# Patient Record
Sex: MALE | Race: WHITE | NOT HISPANIC OR LATINO | Employment: OTHER | URBAN - NONMETROPOLITAN AREA
[De-identification: names, ages, dates, MRNs, and addresses within clinical notes are randomized per-mention and may not be internally consistent; named-entity substitution may affect disease eponyms.]

---

## 2018-12-07 ENCOUNTER — OFFICE VISIT (OUTPATIENT)
Dept: ENDOCRINOLOGY | Facility: CLINIC | Age: 78
End: 2018-12-07

## 2018-12-07 VITALS
SYSTOLIC BLOOD PRESSURE: 132 MMHG | DIASTOLIC BLOOD PRESSURE: 78 MMHG | HEART RATE: 95 BPM | WEIGHT: 210.5 LBS | HEIGHT: 71 IN | OXYGEN SATURATION: 96 % | BODY MASS INDEX: 29.47 KG/M2

## 2018-12-07 DIAGNOSIS — Z98.890 HISTORY OF PITUITARY SURGERY: Primary | ICD-10-CM

## 2018-12-07 DIAGNOSIS — E23.0 PANHYPOPITUITARISM (HCC): ICD-10-CM

## 2018-12-07 DIAGNOSIS — I10 ESSENTIAL HYPERTENSION: ICD-10-CM

## 2018-12-07 PROCEDURE — 99204 OFFICE O/P NEW MOD 45 MIN: CPT | Performed by: INTERNAL MEDICINE

## 2018-12-07 RX ORDER — HYDROCHLOROTHIAZIDE 25 MG/1
50 TABLET ORAL DAILY
COMMUNITY

## 2018-12-07 RX ORDER — HYDROCORTISONE 5 MG/1
5 TABLET ORAL DAILY
Qty: 120 TABLET | Refills: 5 | Status: SHIPPED | OUTPATIENT
Start: 2018-12-07 | End: 2019-12-07

## 2018-12-07 RX ORDER — CORTISONE ACETATE 25 MG/1
20 TABLET ORAL DAILY
COMMUNITY
End: 2018-12-07

## 2018-12-07 RX ORDER — FERROUS GLUCONATE 324(37.5)
324 TABLET ORAL
COMMUNITY

## 2018-12-07 RX ORDER — ROSUVASTATIN CALCIUM 5 MG/1
5 TABLET, COATED ORAL DAILY
COMMUNITY

## 2018-12-07 RX ORDER — LOSARTAN POTASSIUM 100 MG/1
100 TABLET ORAL DAILY
COMMUNITY

## 2018-12-07 RX ORDER — HYDROCORTISONE 10 MG/1
10 TABLET ORAL DAILY
Qty: 60 TABLET | Refills: 11 | Status: SHIPPED | OUTPATIENT
Start: 2018-12-07 | End: 2019-12-07

## 2018-12-07 RX ORDER — LEVOTHYROXINE SODIUM 0.03 MG/1
50 TABLET ORAL DAILY
COMMUNITY
End: 2019-01-22

## 2018-12-07 NOTE — PATIENT INSTRUCTIONS
Week 1     15 mg am and 10 mg pm     Week 2    10 mg and 10 mg pm     Week 3    10 mg and 5 mg    Week 4    10 and nothing     On Jan 7th  come to the lab in early morning to do blood work and saliva testing     Appt with us on Jan 10th

## 2018-12-07 NOTE — PROGRESS NOTES
Simeon Robin is a 78 y.o. 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     Family History   Problem Relation Age of Onset   • Hypertension Mother      Social History     Tobacco Use   • Smoking status: Never Smoker   • Smokeless tobacco: Never Used   Substance Use Topics   • Alcohol use: No     Frequency: Never   • Drug use: No         Current Outpatient Medications:   •  ferrous gluconate 324 (37.5 Fe) MG tablet tablet, Take 324 mg by mouth Daily With Breakfast., Disp: , Rfl:   •  hydrochlorothiazide (HYDRODIURIL) 25 MG tablet, Take 50 mg by mouth Daily., Disp: , Rfl:   •  levothyroxine (SYNTHROID, LEVOTHROID) 25 MCG tablet, Take 50 mcg by mouth Daily., Disp: , Rfl:   •  losartan (COZAAR) 100 MG tablet, Take 100 mg by mouth Daily., Disp: , Rfl:   •  rosuvastatin (CRESTOR) 5 MG tablet, Take 5 mg by mouth Daily., Disp: , Rfl:   •  hydrocortisone (CORTEF) 10 MG tablet, Take 1 tablet by mouth Daily. Up to 1 tab po bid, Disp: 60 tablet, Rfl: 11  •  hydrocortisone (CORTEF) 5 MG tablet, Take 1 tablet by mouth Daily. Up to 2 tabs po bid, Disp: 120 tablet, Rfl: 5    Review of Systems    Review of Systems   Constitutional: Positive for unexpected weight change. Negative for activity change, appetite change, chills, diaphoresis, fatigue and fever.   HENT: Negative for congestion, dental problem, drooling, ear discharge, ear pain, facial swelling, mouth sores, postnasal drip, rhinorrhea, sinus pressure, sore throat, tinnitus,  "trouble swallowing and voice change.    Eyes: Negative for photophobia, pain, discharge, redness, itching and visual disturbance.   Respiratory: Negative for apnea, cough, choking, chest tightness, shortness of breath, wheezing and stridor.    Cardiovascular: Negative for chest pain, palpitations and leg swelling.   Gastrointestinal: Negative for abdominal distention, abdominal pain, constipation, diarrhea, nausea and vomiting.   Endocrine: Negative for cold intolerance, heat intolerance, polydipsia, polyphagia and polyuria.   Genitourinary: Negative for decreased urine volume, difficulty urinating, dysuria, flank pain, frequency, hematuria and urgency.   Musculoskeletal: Negative for arthralgias, back pain, gait problem, joint swelling, myalgias, neck pain and neck stiffness.   Skin: Negative for color change, pallor, rash and wound.   Allergic/Immunologic: Negative for immunocompromised state.   Neurological: Negative for dizziness, tremors, seizures, syncope, facial asymmetry, speech difficulty, weakness, light-headedness, numbness and headaches.   Hematological: Negative for adenopathy.   Psychiatric/Behavioral: Negative for agitation, behavioral problems, confusion, decreased concentration, dysphoric mood, hallucinations, self-injury, sleep disturbance and suicidal ideas. The patient is not nervous/anxious and is not hyperactive.         Objective:   /78   Pulse 95   Ht 180.3 cm (71\")   Wt 95.5 kg (210 lb 8 oz)   SpO2 96%   BMI 29.36 kg/m²     Physical Exam   Constitutional: He is oriented to person, place, and time. He appears well-developed and well-nourished. He is cooperative.   HENT:   Head: Normocephalic and atraumatic.   Right Ear: External ear normal.   Left Ear: External ear normal.   Nose: Nose normal.   Mouth/Throat: Oropharynx is clear and moist. No oropharyngeal exudate.   Eyes: Conjunctivae and EOM are normal. Pupils are equal, round, and reactive to light. No scleral icterus. Right eye " exhibits normal extraocular motion. Left eye exhibits normal extraocular motion.   Neck: Neck supple. No JVD present. No muscular tenderness present. No tracheal deviation, no edema and no erythema present. No thyromegaly present.   Cardiovascular: Normal rate, regular rhythm, normal heart sounds and intact distal pulses. Exam reveals no gallop and no friction rub.   No murmur heard.  Pulmonary/Chest: Effort normal and breath sounds normal. No stridor. No respiratory distress. He has no decreased breath sounds. He has no wheezes. He has no rhonchi. He has no rales. He exhibits no tenderness.   Abdominal: Soft. Bowel sounds are normal. He exhibits no distension and no mass. There is no hepatomegaly. There is no tenderness. There is no rebound and no guarding. No hernia.   Musculoskeletal: Normal range of motion. He exhibits no edema, tenderness or deformity.   Lymphadenopathy:     He has no cervical adenopathy.   Neurological: He is alert and oriented to person, place, and time. He has normal reflexes. No cranial nerve deficit. He exhibits normal muscle tone. Coordination normal.   Skin: Skin is warm. No rash noted. No erythema. No pallor.   Psychiatric: He has a normal mood and affect. His behavior is normal. Judgment and thought content normal.   Nursing note and vitals reviewed.      Lab Review    No results found for this or any previous visit.      Assessment/Plan       ICD-10-CM ICD-9-CM   1. History of pituitary surgery Z98.890 V45.89   2. Panhypopituitarism (CMS/HCC) E23.0 253.2   3. Essential hypertension I10 401.9         I reviewed and summarized records from Yina Daniels APRN from 2018 and I reviewed / ordered labs.   From review of records :    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    He is presently on levothyroxine 50 mcgs daily and hydrocortisone 30 mg daily     Plan is to taper steroids :    Week 1     15 mg am and 10 mg pm     Week 2    10 mg and 10 mg pm     Week 3    10 mg and 5 mg    Week  4    10 and nothing     On Jan 7th  come to the lab in early morning to do blood work and saliva testing     Appt with us on Jan 10th             Orders Placed This Encounter   Procedures   • Insulin-like Growth Factor   • T4, Free   • Testosterone   • Prolactin   • TSH   • Comprehensive Metabolic Panel   • ACTH   • Cortisol - AM   • Salivary Cortisol, MS - Saliva, Oral Cavity     8 am to 10 am   • CBC & Differential     Order Specific Question:   Manual Differential     Answer:   No         A copy of my note was sent to Yina Daniels APRN    Please see my above opinion and suggestions.

## 2018-12-09 PROBLEM — Z98.890 HISTORY OF PITUITARY SURGERY: Status: ACTIVE | Noted: 2018-12-09

## 2018-12-09 PROBLEM — I10 ESSENTIAL HYPERTENSION: Status: ACTIVE | Noted: 2018-12-09

## 2018-12-09 PROBLEM — E23.0 PANHYPOPITUITARISM (HCC): Status: ACTIVE | Noted: 2018-12-09

## 2019-01-10 ENCOUNTER — OFFICE VISIT (OUTPATIENT)
Dept: ENDOCRINOLOGY | Facility: CLINIC | Age: 79
End: 2019-01-10

## 2019-01-10 VITALS
SYSTOLIC BLOOD PRESSURE: 132 MMHG | HEIGHT: 71 IN | WEIGHT: 216.6 LBS | DIASTOLIC BLOOD PRESSURE: 78 MMHG | HEART RATE: 81 BPM | OXYGEN SATURATION: 98 % | BODY MASS INDEX: 30.32 KG/M2

## 2019-01-10 DIAGNOSIS — E23.0 PANHYPOPITUITARISM (HCC): ICD-10-CM

## 2019-01-10 DIAGNOSIS — Z98.890 HISTORY OF PITUITARY SURGERY: Primary | ICD-10-CM

## 2019-01-10 DIAGNOSIS — E03.8 CENTRAL HYPOTHYROIDISM: ICD-10-CM

## 2019-01-10 DIAGNOSIS — E23.0 HYPOGONADOTROPIC HYPOGONADISM (HCC): ICD-10-CM

## 2019-01-10 DIAGNOSIS — E23.0 PANHYPOPITUITARISM (HCC): Primary | ICD-10-CM

## 2019-01-10 PROCEDURE — 99214 OFFICE O/P EST MOD 30 MIN: CPT | Performed by: NURSE PRACTITIONER

## 2019-01-14 LAB
CORTIS AM PEAK SERPL-MCNC: 8.34 MCG/DL (ref 4.46–22.7)
T4 FREE SERPL-MCNC: 0.7 NG/DL (ref 0.78–2.19)

## 2019-01-14 PROCEDURE — 84439 ASSAY OF FREE THYROXINE: CPT | Performed by: INTERNAL MEDICINE

## 2019-01-14 PROCEDURE — 82533 TOTAL CORTISOL: CPT | Performed by: INTERNAL MEDICINE

## 2019-01-14 PROCEDURE — 36415 COLL VENOUS BLD VENIPUNCTURE: CPT | Performed by: INTERNAL MEDICINE

## 2019-01-14 PROCEDURE — 84403 ASSAY OF TOTAL TESTOSTERONE: CPT | Performed by: INTERNAL MEDICINE

## 2019-01-14 PROCEDURE — 82024 ASSAY OF ACTH: CPT | Performed by: INTERNAL MEDICINE

## 2019-01-14 PROCEDURE — 84410 TESTOSTERONE BIOAVAILABLE: CPT | Performed by: INTERNAL MEDICINE

## 2019-01-15 LAB
ACTH PLAS-MCNC: 22.3 PG/ML (ref 7.2–63.3)
TESTOST SERPL-MCNC: 51 NG/DL (ref 264–916)

## 2019-01-18 LAB
CORTIS SAL-MCNC: 0.13 UG/DL
SALIVARY CORTISOL #2: 0.04 UG/DL
SALIVARY CORTISOL #3: 0.2 UG/DL
SALIVARY CORTISOL #4: 0.05 UG/DL

## 2019-01-19 LAB
BIOAVAILABLE TESTOSTERONE, %: 39.8 %
BIOAVAILABLE TESTOSTERONE, S: 31 NG/DL
TESTOST SERPL-MCNC: 77 NG/DL

## 2019-01-22 ENCOUNTER — TELEPHONE (OUTPATIENT)
Dept: FAMILY MEDICINE CLINIC | Facility: CLINIC | Age: 79
End: 2019-01-22

## 2019-01-22 DIAGNOSIS — E55.9 VITAMIN D DEFICIENCY: ICD-10-CM

## 2019-01-22 DIAGNOSIS — E23.0 PANHYPOPITUITARISM (HCC): Primary | ICD-10-CM

## 2019-01-22 DIAGNOSIS — N40.0 PROSTATISM: ICD-10-CM

## 2019-01-22 RX ORDER — LEVOTHYROXINE SODIUM 0.07 MG/1
75 TABLET ORAL DAILY
Qty: 30 TABLET | Refills: 11 | Status: SHIPPED | OUTPATIENT
Start: 2019-01-22 | End: 2019-05-30 | Stop reason: ALTCHOICE

## 2019-01-22 RX ORDER — TESTOSTERONE GEL, 1% 10 MG/G
50 GEL TRANSDERMAL DAILY
Qty: 30 EACH | Refills: 5 | Status: SHIPPED | OUTPATIENT
Start: 2019-01-22 | End: 2019-02-27

## 2019-01-23 ENCOUNTER — TELEPHONE (OUTPATIENT)
Dept: ENDOCRINOLOGY | Facility: CLINIC | Age: 79
End: 2019-01-23

## 2019-01-23 NOTE — TELEPHONE ENCOUNTER
----- Message from Topher Izaguirre MD sent at 1/22/2019  9:47 PM CST -----  He saw Oseas last. Please let him know his cortisol levels in blood and saliva are normal. If he has been of hydrocortisone then he does not need to restart it. He should be off hydrocortisone.    His thyroid is still low . I have increased his dose to 75 mcgs of levothyroxine daily     His testosterone is low. I have called in a rx for testosterone gel , 1 packet daily to be applied in the shoulder area every day.     He should make appt to see Oseas in 5 weeks and labs the week before at [place].     Labs before appt

## 2019-01-24 ENCOUNTER — TELEPHONE (OUTPATIENT)
Dept: ENDOCRINOLOGY | Facility: CLINIC | Age: 79
End: 2019-01-24

## 2019-01-24 NOTE — TELEPHONE ENCOUNTER
earnest walker Key: [id number] - PA Case ID: [id number] Need help? Call us at [phone]   Status   [facility]   DrugTestosterone 50 MG/5GM(1%) TD GEL   Formritika Dubois PA Form

## 2019-01-29 PROBLEM — E23.0 HYPOGONADOTROPIC HYPOGONADISM (HCC): Status: ACTIVE | Noted: 2019-01-29

## 2019-01-29 RX ORDER — TESTOSTERONE CYPIONATE 200 MG/ML
INJECTION, SOLUTION INTRAMUSCULAR
Qty: 2 ML | Refills: 5 | Status: SHIPPED | OUTPATIENT
Start: 2019-01-29 | End: 2019-04-04 | Stop reason: SDUPTHER

## 2019-02-27 ENCOUNTER — OFFICE VISIT (OUTPATIENT)
Dept: ENDOCRINOLOGY | Facility: CLINIC | Age: 79
End: 2019-02-27

## 2019-02-27 VITALS
BODY MASS INDEX: 29.72 KG/M2 | SYSTOLIC BLOOD PRESSURE: 150 MMHG | WEIGHT: 212.3 LBS | HEART RATE: 81 BPM | HEIGHT: 71 IN | DIASTOLIC BLOOD PRESSURE: 76 MMHG

## 2019-02-27 DIAGNOSIS — N40.0 PROSTATISM: ICD-10-CM

## 2019-02-27 DIAGNOSIS — E23.0 PANHYPOPITUITARISM (HCC): Primary | ICD-10-CM

## 2019-02-27 DIAGNOSIS — Z98.890 HISTORY OF PITUITARY SURGERY: ICD-10-CM

## 2019-02-27 DIAGNOSIS — I10 ESSENTIAL HYPERTENSION: ICD-10-CM

## 2019-02-27 DIAGNOSIS — E03.8 CENTRAL HYPOTHYROIDISM: ICD-10-CM

## 2019-02-27 DIAGNOSIS — E23.0 HYPOGONADOTROPIC HYPOGONADISM (HCC): ICD-10-CM

## 2019-02-27 DIAGNOSIS — E55.9 VITAMIN D DEFICIENCY: ICD-10-CM

## 2019-02-27 PROCEDURE — 99214 OFFICE O/P EST MOD 30 MIN: CPT | Performed by: INTERNAL MEDICINE

## 2019-02-27 NOTE — PROGRESS NOTES
Subjective    Simeon Robin is a 78 y.o. male. he is here today for follow-up.    Hypothyroidism   Pertinent negatives include no abdominal pain, arthralgias, chest pain, coughing, diaphoresis, fatigue, headaches, joint swelling, myalgias, nausea, neck pain, numbness, rash, sore throat, vomiting or weakness.          Referring provider  Primary Care Provider     Yina Daniels APRN        Hypopituitarism     Context, 2.8 cm pituitary adenoma     Alleviating , Hypophysectomy July 26, 2018      Timing, constant     Quality , controlled on  hormone replacement     Severity, moderate     Symptoms, none         The following portions of the patient's history were reviewed and updated as appropriate:   Past Medical History:   Diagnosis Date   • Essential hypertension 12/9/2018   • History of pituitary surgery 12/9/2018   • 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     • hydrochlorothiazide (HYDRODIURIL) 25 MG tablet Take 50 mg by mouth Daily.     • hydrocortisone (CORTEF) 10 MG tablet Take 1 tablet by mouth Daily. Up to 1 tab po bid 60 tablet 11   • hydrocortisone (CORTEF) 5 MG tablet Take 1 tablet by mouth Daily. Up to 2 tabs po bid 120 tablet 5   • levothyroxine (SYNTHROID) 75 MCG tablet Take 1 tablet by mouth Daily. 30 tablet 11   • losartan (COZAAR) 100 MG tablet Take 100 mg by mouth Daily.     • rosuvastatin (CRESTOR) 5 MG tablet Take 5 mg by mouth Daily.     • testosterone (ANDROGEL) 50 MG/5GM (1%) gel gel Place 50 mg on the skin as directed by provider Daily. 30 each 5   • Testosterone Cypionate (DEPOTESTOTERONE CYPIONATE) 200 MG/ML injection 70mg(0.7ml)weekly,  Provide(#4)18G,(#4)21Gneedles (#4)3mlsyringes q month 2 mL 5     No current facility-administered medications for this visit.      Allergies   Allergen Reactions   • Donepezil Other (See Comments) and Unknown (See Comments)     Bad dreams  Bad dreams     • Naproxen Sodium Palpitations     Makes   Blood pressure     Social History     Socioeconomic History   • Marital status: Unknown     Spouse name: Not on file   • Number of children: Not on file   • Years of education: Not on file   • Highest education level: Not on file   Tobacco Use   • Smoking status: Never Smoker   • Smokeless tobacco: Never Used   Substance and Sexual Activity   • Alcohol use: No     Frequency: Never   • Drug use: No   • Sexual activity: No       Review of Systems  Review of Systems   Constitutional: Negative for activity change, appetite change, diaphoresis and fatigue.   HENT: Negative for facial swelling, sneezing, sore throat, tinnitus, trouble swallowing and voice change.    Eyes: Negative for photophobia, pain, discharge, redness, itching and visual disturbance.   Respiratory: Negative for apnea, cough, choking, chest tightness and shortness of breath.    Cardiovascular: Negative for chest pain, palpitations and leg swelling.   Gastrointestinal: Negative for abdominal distention, abdominal pain, constipation, diarrhea, nausea and vomiting.   Endocrine: Negative for cold intolerance, heat intolerance, polydipsia, polyphagia and polyuria.   Genitourinary: Negative for difficulty urinating, dysuria, frequency, hematuria and urgency.   Musculoskeletal: Negative for arthralgias, back pain, gait problem, joint swelling, myalgias, neck pain and neck stiffness.   Skin: Negative for color change, pallor, rash and wound.   Neurological: Negative for dizziness, tremors, weakness, light-headedness, numbness and headaches.   Hematological: Negative for adenopathy. Does not bruise/bleed easily.   Psychiatric/Behavioral: Negative for behavioral  "problems, confusion and sleep disturbance.        Objective    /76   Pulse 81   Ht 180.3 cm (71\")   Wt 96.3 kg (212 lb 4.8 oz)   BMI 29.61 kg/m²   Physical Exam   Constitutional: He is oriented to person, place, and time. He appears well-developed and well-nourished. No distress.   HENT:   Head: Normocephalic and atraumatic.   Right Ear: External ear normal.   Left Ear: External ear normal.   Nose: Nose normal.   Eyes: Conjunctivae and EOM are normal. Pupils are equal, round, and reactive to light.   Neck: Normal range of motion. Neck supple. No tracheal deviation present. No thyromegaly present.   Cardiovascular: Normal rate, regular rhythm and normal heart sounds.   No murmur heard.  Pulmonary/Chest: Effort normal and breath sounds normal. No respiratory distress. He has no wheezes.   Abdominal: Soft. Bowel sounds are normal. There is no tenderness. There is no rebound and no guarding.   Musculoskeletal: Normal range of motion. He exhibits no edema, tenderness or deformity.   Neurological: He is alert and oriented to person, place, and time. No cranial nerve deficit.   Skin: Skin is warm and dry. No rash noted.   Psychiatric: He has a normal mood and affect. His behavior is normal. Judgment and thought content normal.       Lab Review  Triglycerides (mg/dL)   Date Value   01/07/2019 314 (H)   01/29/2018 491 (H)     LDL Cholesterol  (mg/dL)   Date Value   01/07/2019 103 (H)   01/29/2018 92       Assessment/Plan      1. Panhypopituitarism (CMS/HCC)    2. Vitamin D deficiency    3. Prostatism    4. History of pituitary surgery    5. Central hypothyroidism    6. Hypogonadotropic hypogonadism (CMS/HCC)    7. Essential hypertension    .    Medications prescribed:  Outpatient Encounter Medications as of 2/27/2019   Medication Sig Dispense Refill   • ferrous gluconate 324 (37.5 Fe) MG tablet tablet Take 324 mg by mouth Daily With Breakfast.     • hydrochlorothiazide (HYDRODIURIL) 25 MG tablet Take 50 mg by mouth " Daily.     • hydrocortisone (CORTEF) 10 MG tablet Take 1 tablet by mouth Daily. Up to 1 tab po bid 60 tablet 11   • hydrocortisone (CORTEF) 5 MG tablet Take 1 tablet by mouth Daily. Up to 2 tabs po bid 120 tablet 5   • levothyroxine (SYNTHROID) 75 MCG tablet Take 1 tablet by mouth Daily. 30 tablet 11   • losartan (COZAAR) 100 MG tablet Take 100 mg by mouth Daily.     • rosuvastatin (CRESTOR) 5 MG tablet Take 5 mg by mouth Daily.     • testosterone (ANDROGEL) 50 MG/5GM (1%) gel gel Place 50 mg on the skin as directed by provider Daily. 30 each 5   • Testosterone Cypionate (DEPOTESTOTERONE CYPIONATE) 200 MG/ML injection 70mg(0.7ml)weekly, Provide(#4)18G,(#4)21Gneedles (#4)3mlsyringes q month 2 mL 5     No facility-administered encounter medications on file as of 2/27/2019.        Orders placed during this encounter include:  No orders of the defined types were placed in this encounter.    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         Central hypothyroidism     On levothyroxine 75 mcgs daily        --    Hypogonadotropic Hypogonadism    Testosterone cypionate start as 70 mg IM weekly     --    Was previously on hydrocortisone for adrenal insufficiency that I stopped and demonstrated he has intact adrenal axis    --    Normal IGF1        4. Follow-up: No Follow-up on file.

## 2019-03-01 ENCOUNTER — OFFICE VISIT (OUTPATIENT)
Dept: FAMILY MEDICINE CLINIC | Facility: CLINIC | Age: 79
End: 2019-03-01

## 2019-03-01 VITALS
SYSTOLIC BLOOD PRESSURE: 130 MMHG | DIASTOLIC BLOOD PRESSURE: 70 MMHG | HEIGHT: 71 IN | HEART RATE: 85 BPM | RESPIRATION RATE: 16 BRPM | BODY MASS INDEX: 29.96 KG/M2 | TEMPERATURE: 98.2 F | WEIGHT: 214 LBS | OXYGEN SATURATION: 98 %

## 2019-03-01 DIAGNOSIS — E23.0 PANHYPOPITUITARISM (HCC): ICD-10-CM

## 2019-03-01 DIAGNOSIS — E23.0 HYPOGONADOTROPIC HYPOGONADISM (HCC): Primary | ICD-10-CM

## 2019-03-01 DIAGNOSIS — Z98.890 HISTORY OF PITUITARY SURGERY: ICD-10-CM

## 2019-03-01 PROBLEM — E03.8 CENTRAL HYPOTHYROIDISM: Chronic | Status: ACTIVE | Noted: 2019-01-10

## 2019-03-01 PROBLEM — I10 ESSENTIAL HYPERTENSION: Chronic | Status: ACTIVE | Noted: 2018-12-09

## 2019-03-01 PROCEDURE — 99213 OFFICE O/P EST LOW 20 MIN: CPT | Performed by: NURSE PRACTITIONER

## 2019-03-01 PROCEDURE — 96372 THER/PROPH/DIAG INJ SC/IM: CPT | Performed by: NURSE PRACTITIONER

## 2019-03-01 RX ORDER — TESTOSTERONE CYPIONATE 100 MG/ML
70 INJECTION, SOLUTION INTRAMUSCULAR WEEKLY
Qty: 2.8 ML | Refills: 0 | Status: SHIPPED | OUTPATIENT
Start: 2019-03-01 | End: 2019-04-04 | Stop reason: SDUPTHER

## 2019-03-01 RX ORDER — TESTOSTERONE CYPIONATE 200 MG/ML
70 INJECTION, SOLUTION INTRAMUSCULAR WEEKLY
Status: DISCONTINUED | OUTPATIENT
Start: 2019-03-01 | End: 2019-08-09

## 2019-03-01 RX ADMIN — TESTOSTERONE CYPIONATE 70 MG: 200 INJECTION, SOLUTION INTRAMUSCULAR at 10:30

## 2019-03-01 NOTE — PROGRESS NOTES
Chief Complaint   Patient presents with   • Establish Care     Subjective   Simeon Robin is a 78 y.o. male who presents to the office for Hypogonadotropic hypogonadism (CMS/HCC)  related to panhypopituitarism. He desires to obtain his necessary testosterone and injections from this office, for his convenience.  Initially obtained from Dr Izaguirre, he requires weekly injections of testosterone cypionate 70mg as directed by Dr Izaguirre. We will follow Dr Izaguirre's orders and administer these in our office.    The following portions of the patient's history were reviewed and updated as appropriate: allergies, current medications, past family history, past medical history, past social history, past surgical history and problem list.    History of Present Illness     Past Medical History:   Diagnosis Date   • Essential hypertension 12/9/2018   • History of pituitary surgery 12/9/2018   • Panhypopituitarism (CMS/HCC) 12/9/2018          Family History   Problem Relation Age of Onset   • Hypertension Mother         Review of Systems   Constitutional: Negative.  Negative for fever and unexpected weight change.   HENT: Negative.    Eyes: Negative.    Respiratory: Negative.  Negative for cough, chest tightness and shortness of breath.    Cardiovascular: Negative.  Negative for chest pain.   Gastrointestinal: Negative.    Endocrine: Negative.         S/p pituitary surgery due to adenoma. Central hypothyroidism controlled with levothyroxine. Hypogonadotropic hypogonadism requiring weekly injections of testosterone.    Genitourinary: Negative.  Negative for dysuria.   Musculoskeletal: Negative.    Skin: Negative.  Negative for color change, pallor, rash and wound.   Allergic/Immunologic: Negative.    Neurological: Negative.    Hematological: Negative.    Psychiatric/Behavioral: Negative.  Negative for sleep disturbance and suicidal ideas.       Objective   Vitals:    03/01/19 0908   BP: 130/70   BP Location: Left arm  "  Patient Position: Sitting   Cuff Size: Adult   Pulse: 85   Resp: 16   Temp: 98.2 °F (36.8 °C)   TempSrc: Oral   SpO2: 98%   Weight: 97.1 kg (214 lb)   Height: 180.3 cm (71\")   PainSc:   4     Physical Exam   Constitutional: He is oriented to person, place, and time. He appears well-developed and well-nourished.   HENT:   Head: Normocephalic and atraumatic.   Eyes: Conjunctivae are normal. Pupils are equal, round, and reactive to light.   Neck: Normal range of motion. Neck supple.   Cardiovascular: Normal rate, regular rhythm, normal heart sounds and intact distal pulses. Exam reveals no gallop and no friction rub.   No murmur heard.  Pulmonary/Chest: Effort normal and breath sounds normal. No respiratory distress. He has no wheezes. He has no rales. He exhibits no tenderness.   Abdominal: Soft. Bowel sounds are normal.   Musculoskeletal: Normal range of motion. He exhibits no edema, tenderness or deformity.   Lymphadenopathy:     He has no cervical adenopathy.   Neurological: He is alert and oriented to person, place, and time.   Skin: Skin is warm and dry. Capillary refill takes 2 to 3 seconds. No erythema. No pallor.   Psychiatric: He has a normal mood and affect. His behavior is normal. Judgment and thought content normal.   Nursing note and vitals reviewed.      Assessment/Plan   Simeon was seen today for establish care.    Diagnoses and all orders for this visit:    Hypogonadotropic hypogonadism (CMS/HCC)  -     Testosterone Cypionate (DEPOTESTOTERONE CYPIONATE) injection 70 mg; Inject 0.35 mL into the appropriate muscle as directed by prescriber 1 (One) Time Per Week.    History of pituitary surgery  -     Testosterone Cypionate (DEPOTESTOTERONE CYPIONATE) injection 70 mg; Inject 0.35 mL into the appropriate muscle as directed by prescriber 1 (One) Time Per Week.    Panhypopituitarism (CMS/HCC)  -     Testosterone Cypionate (DEPOTESTOTERONE CYPIONATE) injection 70 mg; Inject 0.35 mL into the appropriate " muscle as directed by prescriber 1 (One) Time Per Week.    Other orders  -     testosterone cypionate (DEPO-TESTOSTERONE) 100 MG/ML solution injection; Inject 0.7 mL into the appropriate muscle as directed by prescriber 1 (One) Time Per Week for 28 days.           PHQ-2/PHQ-9 Depression Screening 3/1/2019   Little interest or pleasure in doing things 0   Feeling down, depressed, or hopeless 0   Total Score 0     Patient understands the risks associated with this controlled medication, including tolerance and addiction.  Patient also agrees to only obtain this medication from me, and not from a another provider, unless that provider is covering for me in my absence.  Patient also agrees to be compliant in dosing, and not self adjust the dose of medication.  A signed controlled substance agreement is on file, and the patient has received a controlled substance education sheet at this a previous visit.  The patient has also signed a consent for treatment with a controlled substance as per [facility] policy. BRENNAN was obtained.    EDEPA Gates         Return in about 4 weeks (around 3/29/2019).    Patient Instructions   Return weekly for injection of testosterone      Orders Only on 01/10/2019   Component Date Value Ref Range Status   • Free T4 01/14/2019 0.70* 0.78 - 2.19 ng/dL Final   • Testosterone, Total 01/14/2019 51* 264 - 916 ng/dL Final    Adult male reference interval is based on a population of  healthy nonobese males (BMI <30) between 19 and 39 years old.  Shelly, et.al. JCEM 2017,102;2737-1885. PMID: [id number].   • Testosterone, Total 01/14/2019 77* ng/dL Final    Reference Range:  Adult Males  >18 years    264 - 916  This [facility] LC/MS-MS method is currently certified by the  CDC Hormone Standardization Program (HoST).  Adult male  reference interval is based on a population of healthy  nonobese males (BMI <30) between 19 and 39 years old.  Shelly, et.al. JCEM 2017,102;2204-6618 PMID:  [id number].   • Testosterone, Bioavailable 01/14/2019 31* ng/dL Final    Reference Range:  Males (70 - 79y): 60 - 240   • Testosterone, % Bioavailable 01/14/2019 39.8  % Final    Verified by repeat analysis.   • ACTH 01/14/2019 22.3  7.2 - 63.3 pg/mL Final    ACTH reference interval for samples collected between 7 and 10 AM.   • Cortisol - AM 01/14/2019 8.34  4.46 - 22.70 mcg/dL Final   • Cortisol, Salivary 01/14/2019 0.130  ug/dL Final    Draw date/time: 01/12/19 - 08:00  Reference Range:  Children and Adults:  8:00a.m.:   0.025 - 0.600  Noon:      <0.010 - 0.330  4:00p.m.:   0.010 - 0.200  Midnight:  <0.010 - 0.090   • Cortisol, Salivary #2 01/14/2019 0.040  ug/dL Final    Draw date/time: 01/12/19 - 23:00   • Cortisol, Salivary #3 01/14/2019 0.195  ug/dL Final    Draw date/time: 01/13/19 - 08:00   • Cortisol, Salivary #4 01/14/2019 0.047  ug/dL Final    Draw date/time: 01/13/19 - 23:00   ]

## 2019-03-07 ENCOUNTER — CLINICAL SUPPORT (OUTPATIENT)
Dept: FAMILY MEDICINE CLINIC | Facility: CLINIC | Age: 79
End: 2019-03-07

## 2019-03-07 DIAGNOSIS — E34.9 TESTOSTERONE DEFICIENCY: ICD-10-CM

## 2019-03-07 PROCEDURE — 96372 THER/PROPH/DIAG INJ SC/IM: CPT | Performed by: NURSE PRACTITIONER

## 2019-03-07 RX ADMIN — TESTOSTERONE CYPIONATE 70 MG: 200 INJECTION, SOLUTION INTRAMUSCULAR at 14:04

## 2019-03-14 ENCOUNTER — CLINICAL SUPPORT (OUTPATIENT)
Dept: FAMILY MEDICINE CLINIC | Facility: CLINIC | Age: 79
End: 2019-03-14

## 2019-03-14 DIAGNOSIS — E34.9 TESTOSTERONE DEFICIENCY: ICD-10-CM

## 2019-03-14 PROCEDURE — 96372 THER/PROPH/DIAG INJ SC/IM: CPT | Performed by: NURSE PRACTITIONER

## 2019-03-14 RX ADMIN — TESTOSTERONE CYPIONATE 70 MG: 200 INJECTION, SOLUTION INTRAMUSCULAR at 13:55

## 2019-03-28 ENCOUNTER — CLINICAL SUPPORT (OUTPATIENT)
Dept: FAMILY MEDICINE CLINIC | Facility: CLINIC | Age: 79
End: 2019-03-28

## 2019-03-28 DIAGNOSIS — E23.0 HYPOGONADOTROPIC HYPOGONADISM (HCC): Chronic | ICD-10-CM

## 2019-03-28 PROCEDURE — 96372 THER/PROPH/DIAG INJ SC/IM: CPT | Performed by: FAMILY MEDICINE

## 2019-03-28 RX ADMIN — TESTOSTERONE CYPIONATE 70 MG: 200 INJECTION, SOLUTION INTRAMUSCULAR at 16:08

## 2019-04-04 ENCOUNTER — CLINICAL SUPPORT (OUTPATIENT)
Dept: FAMILY MEDICINE CLINIC | Facility: CLINIC | Age: 79
End: 2019-04-04

## 2019-04-04 DIAGNOSIS — E34.9 TESTOSTERONE DEFICIENCY: ICD-10-CM

## 2019-04-04 PROCEDURE — 96372 THER/PROPH/DIAG INJ SC/IM: CPT | Performed by: NURSE PRACTITIONER

## 2019-04-04 RX ORDER — TESTOSTERONE CYPIONATE 100 MG/ML
70 INJECTION, SOLUTION INTRAMUSCULAR WEEKLY
Qty: 4 ML | Refills: 0 | Status: SHIPPED | OUTPATIENT
Start: 2019-04-04 | End: 2019-04-04 | Stop reason: SDUPTHER

## 2019-04-04 RX ORDER — TESTOSTERONE CYPIONATE 100 MG/ML
70 INJECTION, SOLUTION INTRAMUSCULAR WEEKLY
Qty: 4 ML | Refills: 0 | Status: SHIPPED | OUTPATIENT
Start: 2019-04-04 | End: 2019-04-16 | Stop reason: SDUPTHER

## 2019-04-04 RX ADMIN — TESTOSTERONE CYPIONATE 70 MG: 200 INJECTION, SOLUTION INTRAMUSCULAR at 14:27

## 2019-04-04 RX ADMIN — TESTOSTERONE CYPIONATE 70 MG: 200 INJECTION, SOLUTION INTRAMUSCULAR at 12:17

## 2019-04-15 LAB
ALBUMIN SERPL-MCNC: 4.1 G/DL (ref 3.5–5)
ALBUMIN/GLOB SERPL: 1.4 G/DL (ref 1.1–1.8)
ALP SERPL-CCNC: 60 U/L (ref 38–126)
ALT SERPL W P-5'-P-CCNC: 44 U/L
ANION GAP SERPL CALCULATED.3IONS-SCNC: 8 MMOL/L (ref 5–15)
AST SERPL-CCNC: 42 U/L (ref 17–59)
BILIRUB SERPL-MCNC: 0.7 MG/DL (ref 0.2–1.3)
BUN BLD-MCNC: 20 MG/DL (ref 7–23)
BUN/CREAT SERPL: 18 (ref 7–25)
CALCIUM SPEC-SCNC: 9.2 MG/DL (ref 8.4–10.2)
CHLORIDE SERPL-SCNC: 103 MMOL/L (ref 101–112)
CO2 SERPL-SCNC: 30 MMOL/L (ref 22–30)
CORTIS AM PEAK SERPL-MCNC: 7.12 MCG/DL
CREAT BLD-MCNC: 1.11 MG/DL (ref 0.7–1.3)
DEPRECATED RDW RBC AUTO: 47.4 FL (ref 37–54)
EOSINOPHIL # BLD MANUAL: 0.13 10*3/MM3 (ref 0–0.4)
EOSINOPHIL NFR BLD MANUAL: 2 % (ref 0.3–6.2)
ERYTHROCYTE [DISTWIDTH] IN BLOOD BY AUTOMATED COUNT: 13.6 % (ref 12.3–15.4)
GFR SERPL CREATININE-BSD FRML MDRD: 64 ML/MIN/1.73 (ref 42–98)
GLOBULIN UR ELPH-MCNC: 2.9 GM/DL (ref 2.3–3.5)
GLUCOSE BLD-MCNC: 114 MG/DL (ref 70–99)
HCT VFR BLD AUTO: 48.6 % (ref 37.5–51)
HGB BLD-MCNC: 16.3 G/DL (ref 13–17.7)
LYMPHOCYTES # BLD MANUAL: 1.47 10*3/MM3 (ref 0.7–3.1)
LYMPHOCYTES NFR BLD MANUAL: 14 % (ref 5–12)
LYMPHOCYTES NFR BLD MANUAL: 23 % (ref 19.6–45.3)
MCH RBC QN AUTO: 33 PG (ref 26.6–33)
MCHC RBC AUTO-ENTMCNC: 33.5 G/DL (ref 31.5–35.7)
MCV RBC AUTO: 98.4 FL (ref 79–97)
MONOCYTES # BLD AUTO: 0.89 10*3/MM3 (ref 0.1–0.9)
NEUTROPHILS # BLD AUTO: 3.9 10*3/MM3 (ref 1.4–7)
NEUTROPHILS NFR BLD MANUAL: 61 % (ref 42.7–76)
PLATELET # BLD AUTO: 192 10*3/MM3 (ref 140–450)
PMV BLD AUTO: 10.2 FL (ref 6–12)
POTASSIUM BLD-SCNC: 4.2 MMOL/L (ref 3.4–5)
PROT SERPL-MCNC: 7 G/DL (ref 6.3–8.6)
RBC # BLD AUTO: 4.94 10*6/MM3 (ref 4.14–5.8)
RBC MORPH BLD: NORMAL
SMALL PLATELETS BLD QL SMEAR: ADEQUATE
SODIUM BLD-SCNC: 141 MMOL/L (ref 137–145)
T4 FREE SERPL-MCNC: 0.81 NG/DL (ref 0.93–1.7)
TESTOST SERPL-MCNC: 561 NG/DL (ref 193–740)
WBC MORPH BLD: NORMAL
WBC NRBC COR # BLD: 6.39 10*3/MM3 (ref 3.4–10.8)

## 2019-04-15 PROCEDURE — 84439 ASSAY OF FREE THYROXINE: CPT | Performed by: INTERNAL MEDICINE

## 2019-04-15 PROCEDURE — 82533 TOTAL CORTISOL: CPT | Performed by: INTERNAL MEDICINE

## 2019-04-15 PROCEDURE — 84403 ASSAY OF TOTAL TESTOSTERONE: CPT | Performed by: INTERNAL MEDICINE

## 2019-04-15 PROCEDURE — 36415 COLL VENOUS BLD VENIPUNCTURE: CPT | Performed by: INTERNAL MEDICINE

## 2019-04-15 PROCEDURE — 82024 ASSAY OF ACTH: CPT | Performed by: INTERNAL MEDICINE

## 2019-04-15 PROCEDURE — 85025 COMPLETE CBC W/AUTO DIFF WBC: CPT | Performed by: INTERNAL MEDICINE

## 2019-04-15 PROCEDURE — 84153 ASSAY OF PSA TOTAL: CPT | Performed by: INTERNAL MEDICINE

## 2019-04-15 PROCEDURE — 80053 COMPREHEN METABOLIC PANEL: CPT | Performed by: INTERNAL MEDICINE

## 2019-04-16 DIAGNOSIS — E34.9 TESTOSTERONE DEFICIENCY: Primary | ICD-10-CM

## 2019-04-16 RX ORDER — TESTOSTERONE CYPIONATE 200 MG/ML
200 INJECTION, SOLUTION INTRAMUSCULAR
Qty: 1 ML | Refills: 5 | Status: CANCELLED | OUTPATIENT
Start: 2019-04-16

## 2019-04-16 RX ORDER — TESTOSTERONE CYPIONATE 200 MG/ML
INJECTION, SOLUTION INTRAMUSCULAR
Refills: 0 | COMMUNITY
Start: 2019-04-04 | End: 2019-04-16 | Stop reason: SDUPTHER

## 2019-04-16 RX ORDER — TESTOSTERONE CYPIONATE 100 MG/ML
70 INJECTION, SOLUTION INTRAMUSCULAR WEEKLY
Qty: 4 ML | Refills: 0 | Status: SHIPPED | OUTPATIENT
Start: 2019-04-16 | End: 2019-04-19 | Stop reason: SDUPTHER

## 2019-04-17 LAB
ACTH PLAS-MCNC: 38.2 PG/ML (ref 7.2–63.3)
PSA SERPL-MCNC: 0.4 NG/ML (ref 0–4)
REFLEX: NORMAL

## 2019-04-18 ENCOUNTER — CLINICAL SUPPORT (OUTPATIENT)
Dept: FAMILY MEDICINE CLINIC | Facility: CLINIC | Age: 79
End: 2019-04-18

## 2019-04-18 DIAGNOSIS — E34.9 TESTOSTERONE DEFICIENCY: ICD-10-CM

## 2019-04-18 PROCEDURE — 96372 THER/PROPH/DIAG INJ SC/IM: CPT | Performed by: NURSE PRACTITIONER

## 2019-04-18 RX ADMIN — TESTOSTERONE CYPIONATE 70 MG: 200 INJECTION, SOLUTION INTRAMUSCULAR at 16:33

## 2019-04-19 ENCOUNTER — OFFICE VISIT (OUTPATIENT)
Dept: ENDOCRINOLOGY | Facility: CLINIC | Age: 79
End: 2019-04-19

## 2019-04-19 VITALS
HEIGHT: 71 IN | HEART RATE: 71 BPM | BODY MASS INDEX: 30.24 KG/M2 | WEIGHT: 216 LBS | SYSTOLIC BLOOD PRESSURE: 124 MMHG | DIASTOLIC BLOOD PRESSURE: 80 MMHG

## 2019-04-19 DIAGNOSIS — E23.0 HYPOGONADOTROPIC HYPOGONADISM (HCC): ICD-10-CM

## 2019-04-19 DIAGNOSIS — E03.8 CENTRAL HYPOTHYROIDISM: Primary | ICD-10-CM

## 2019-04-19 DIAGNOSIS — R53.83 OTHER FATIGUE: ICD-10-CM

## 2019-04-19 PROCEDURE — 99214 OFFICE O/P EST MOD 30 MIN: CPT | Performed by: NURSE PRACTITIONER

## 2019-04-19 RX ORDER — TESTOSTERONE CYPIONATE 100 MG/ML
40 INJECTION, SOLUTION INTRAMUSCULAR WEEKLY
Qty: 4 ML | Refills: 0
Start: 2019-04-19 | End: 2019-05-03 | Stop reason: SDUPTHER

## 2019-04-19 RX ORDER — LEVOTHYROXINE SODIUM 88 UG/1
88 TABLET ORAL DAILY
Qty: 30 TABLET | Refills: 11 | Status: SHIPPED | OUTPATIENT
Start: 2019-04-19 | End: 2019-05-30 | Stop reason: ALTCHOICE

## 2019-04-19 NOTE — PROGRESS NOTES
Subjective    Simeon Robin is a 78 y.o. male. he is here today for follow-up.    History of Present Illness       Referring provider  Primary Care Provider     Yina Daniels APRN        Hypopituitarism     Context, 2.8 cm pituitary adenoma     Alleviating , Hypophysectomy July 26, 2018      Timing, constant     Quality , controlled on  hormone replacement     Severity, moderate     Symptoms, fatigue    Central hypothyroidism    Currently on Levothyroxine 75 mcg daily    Hypogonadism    Currently taking 70 mg IM weekly        Evaluation history:  TSH   Date Value Ref Range Status   01/07/2019 2.39 0.36 - 3.74 u[iU]/mL Final     Free T4   Date Value Ref Range Status   04/15/2019 0.81 (L) 0.93 - 1.70 ng/dL Final       Current medications:  Current Outpatient Medications   Medication Sig Dispense Refill   • ferrous gluconate 324 (37.5 Fe) MG tablet tablet Take 324 mg by mouth Daily With Breakfast.     • hydrochlorothiazide (HYDRODIURIL) 25 MG tablet Take 50 mg by mouth Daily.     • hydrocortisone (CORTEF) 10 MG tablet Take 1 tablet by mouth Daily. Up to 1 tab po bid 60 tablet 11   • hydrocortisone (CORTEF) 5 MG tablet Take 1 tablet by mouth Daily. Up to 2 tabs po bid 120 tablet 5   • levothyroxine (SYNTHROID) 75 MCG tablet Take 1 tablet by mouth Daily. 30 tablet 11   • levothyroxine (SYNTHROID) 88 MCG tablet Take 1 tablet by mouth Daily. 30 tablet 11   • losartan (COZAAR) 100 MG tablet Take 100 mg by mouth Daily.     • rosuvastatin (CRESTOR) 5 MG tablet Take 5 mg by mouth Daily.     • testosterone cypionate (DEPO-TESTOSTERONE) 100 MG/ML solution injection Inject 0.7 mL into the appropriate muscle as directed by prescriber 1 (One) Time Per Week for 28 days. 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     Past Surgical History:   Procedure Laterality Date   • KIDNEY SURGERY     • TUMOR REMOVAL       Family History   Problem Relation Age of Onset   • Hypertension Mother        Allergies   Allergen Reactions   • Donepezil Other (See Comments) and Unknown (See Comments)     Bad dreams  Bad dreams     • Naproxen Sodium Palpitations     Makes   Blood pressure     Social History     Socioeconomic History   • Marital status: Unknown     Spouse name: Not on file   • Number of children: Not on file   • Years of education: Not on file   • Highest education level: Not on file   Tobacco Use   • Smoking status: Never Smoker   • Smokeless tobacco: Never Used   Substance and Sexual Activity   • Alcohol use: No     Frequency: Never   • Drug use: No   • Sexual activity: No       Review of Systems  Review of Systems   Constitutional: Negative for activity change, appetite change, diaphoresis and fatigue.   HENT: Negative for facial swelling, sneezing, sore throat, tinnitus, trouble swallowing and voice change.    Eyes: Negative for photophobia, pain, discharge, redness, itching and visual disturbance.   Respiratory: Negative for apnea, cough, choking, chest tightness and shortness of breath.    Cardiovascular: Negative for chest pain, palpitations and leg swelling.   Gastrointestinal: Negative for abdominal distention, abdominal pain, constipation, diarrhea, nausea and vomiting.   Endocrine: Negative for cold intolerance, heat intolerance, polydipsia, polyphagia and polyuria.   Genitourinary: Negative for difficulty urinating, dysuria, frequency, hematuria and urgency.   Musculoskeletal: Negative for arthralgias, back pain, gait problem, joint swelling, myalgias, neck  "pain and neck stiffness.   Skin: Negative for color change, pallor, rash and wound.   Neurological: Negative for dizziness, tremors, weakness, light-headedness, numbness and headaches.   Hematological: Negative for adenopathy. Does not bruise/bleed easily.   Psychiatric/Behavioral: Negative for behavioral problems, confusion and sleep disturbance.        Objective    /80 (BP Location: Right arm, Patient Position: Sitting, Cuff Size: Adult)   Pulse 71   Ht 180.3 cm (71\")   Wt 98 kg (216 lb)   BMI 30.13 kg/m²   Physical Exam   Constitutional: He is oriented to person, place, and time. He appears well-developed and well-nourished. No distress.   HENT:   Head: Normocephalic and atraumatic.   Right Ear: External ear normal.   Left Ear: External ear normal.   Nose: Nose normal.   Eyes: Conjunctivae and EOM are normal. Pupils are equal, round, and reactive to light.   Neck: Normal range of motion. Neck supple. No tracheal deviation present. No thyromegaly present.   Cardiovascular: Normal rate, regular rhythm and normal heart sounds.   No murmur heard.  Pulmonary/Chest: Effort normal and breath sounds normal. No respiratory distress. He has no wheezes.   Abdominal: Soft. Bowel sounds are normal. There is no tenderness. There is no rebound and no guarding.   Musculoskeletal: Normal range of motion. He exhibits no edema, tenderness or deformity.   Neurological: He is alert and oriented to person, place, and time. No cranial nerve deficit.   Skin: Skin is warm and dry. No rash noted.   Psychiatric: He has a normal mood and affect. His behavior is normal. Judgment and thought content normal.       Lab Review  Lab Results   Component Value Date    TSH 2.39 01/07/2019     Lab Results   Component Value Date    FREET4 0.81 (L) 04/15/2019        Assessment/Plan      1. Central hypothyroidism    2. Hypogonadotropic hypogonadism (CMS/HCC)    . This diagnosis was discussed and reviewed with the patient including the " advantages of drug therapy.     1. Orders placed during this encounter include:  Orders Placed This Encounter   Procedures   • Testosterone   • Estrogens, Total     Standing Status:   Future     Standing Expiration Date:   4/19/2020   • Comprehensive Metabolic Panel   • T4, Free   • Prolactin   • Estrogens, Total   • CBC & Differential     Order Specific Question:   Manual Differential     Answer:   No       Medications prescribed:  Outpatient Encounter Medications as of 4/19/2019   Medication Sig Dispense Refill   • ferrous gluconate 324 (37.5 Fe) MG tablet tablet Take 324 mg by mouth Daily With Breakfast.     • hydrochlorothiazide (HYDRODIURIL) 25 MG tablet Take 50 mg by mouth Daily.     • hydrocortisone (CORTEF) 10 MG tablet Take 1 tablet by mouth Daily. Up to 1 tab po bid 60 tablet 11   • hydrocortisone (CORTEF) 5 MG tablet Take 1 tablet by mouth Daily. Up to 2 tabs po bid 120 tablet 5   • levothyroxine (SYNTHROID) 75 MCG tablet Take 1 tablet by mouth Daily. 30 tablet 11   • levothyroxine (SYNTHROID) 88 MCG tablet Take 1 tablet by mouth Daily. 30 tablet 11   • losartan (COZAAR) 100 MG tablet Take 100 mg by mouth Daily.     • rosuvastatin (CRESTOR) 5 MG tablet Take 5 mg by mouth Daily.     • testosterone cypionate (DEPO-TESTOSTERONE) 100 MG/ML solution injection Inject 0.7 mL into the appropriate muscle as directed by prescriber 1 (One) Time Per Week for 28 days. 4 mL 0     Facility-Administered Encounter Medications as of 4/19/2019   Medication Dose Route Frequency Provider Last Rate Last Dose   • Testosterone Cypionate (DEPOTESTOTERONE CYPIONATE) injection 70 mg  70 mg Intramuscular Weekly Murdock, DEEPA Crespo   70 mg at 04/18/19 1633     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           Central hypothyroidism      On levothyroxine 75 mcgs daily     Component      Latest Ref Rng & Units 4/15/2019   Free T4      0.93 - 1.70 ng/dL 0.81 (L)                       --     Hypogonadotropic Hypogonadism      Testosterone cypionate start as 70 mg IM weekly ----         Having breast tenderness       Component      Latest Ref Rng & Units 4/15/2019   PSA      0.0 - 4.0 ng/mL 0.4   Reflex       Comment   Testosterone, Total      193.00 - 740.00 ng/dL 561.00          Decrease to 40 mg IM weekly    Breast were not enlarged  --     Was previously on hydrocortisone for adrenal insufficiency that I stopped and demonstrated he has intact adrenal axis     --     Normal IGF1        4. Return in about 5 weeks (around 5/24/2019) for Recheck.

## 2019-04-25 ENCOUNTER — CLINICAL SUPPORT (OUTPATIENT)
Dept: FAMILY MEDICINE CLINIC | Facility: CLINIC | Age: 79
End: 2019-04-25

## 2019-04-25 DIAGNOSIS — E34.9 TESTOSTERONE DEFICIENCY: ICD-10-CM

## 2019-04-25 PROCEDURE — 96372 THER/PROPH/DIAG INJ SC/IM: CPT | Performed by: NURSE PRACTITIONER

## 2019-04-25 RX ADMIN — TESTOSTERONE CYPIONATE 70 MG: 200 INJECTION, SOLUTION INTRAMUSCULAR at 13:51

## 2019-04-29 ENCOUNTER — TELEPHONE (OUTPATIENT)
Dept: ENDOCRINOLOGY | Facility: CLINIC | Age: 79
End: 2019-04-29

## 2019-04-29 NOTE — TELEPHONE ENCOUNTER
----- Message from DEEPA Olmos sent at 4/29/2019  7:51 AM CDT -----  It was increased to 88 mcg I forgot to change in the note  Oseas  ----- Message -----  From: Azul Mcarthur MA  Sent: 4/26/2019   2:31 PM  To: DEEPA Olmos    Patient is confused. Is he supposed to be taking the 75 and the 88 Levothyroxine >??? I did not see any changes to the dose in the notes.  Thanks, Azul

## 2019-05-03 ENCOUNTER — CLINICAL SUPPORT (OUTPATIENT)
Dept: FAMILY MEDICINE CLINIC | Facility: CLINIC | Age: 79
End: 2019-05-03

## 2019-05-03 DIAGNOSIS — E34.9 TESTOSTERONE DEFICIENCY: ICD-10-CM

## 2019-05-03 PROCEDURE — 96372 THER/PROPH/DIAG INJ SC/IM: CPT | Performed by: NURSE PRACTITIONER

## 2019-05-03 RX ORDER — TESTOSTERONE CYPIONATE 100 MG/ML
40 INJECTION, SOLUTION INTRAMUSCULAR WEEKLY
Qty: 4 ML | Refills: 0 | Status: SHIPPED | OUTPATIENT
Start: 2019-05-03 | End: 2019-08-02 | Stop reason: SDUPTHER

## 2019-05-03 RX ADMIN — TESTOSTERONE CYPIONATE 70 MG: 200 INJECTION, SOLUTION INTRAMUSCULAR at 14:00

## 2019-05-10 ENCOUNTER — CLINICAL SUPPORT (OUTPATIENT)
Dept: FAMILY MEDICINE CLINIC | Facility: CLINIC | Age: 79
End: 2019-05-10

## 2019-05-10 DIAGNOSIS — E34.9 TESTOSTERONE DEFICIENCY: ICD-10-CM

## 2019-05-10 PROCEDURE — 96372 THER/PROPH/DIAG INJ SC/IM: CPT | Performed by: NURSE PRACTITIONER

## 2019-05-10 RX ADMIN — TESTOSTERONE CYPIONATE 70 MG: 200 INJECTION, SOLUTION INTRAMUSCULAR at 15:46

## 2019-05-16 RX ADMIN — TESTOSTERONE CYPIONATE 70 MG: 200 INJECTION, SOLUTION INTRAMUSCULAR at 10:03

## 2019-05-17 ENCOUNTER — CLINICAL SUPPORT (OUTPATIENT)
Dept: FAMILY MEDICINE CLINIC | Facility: CLINIC | Age: 79
End: 2019-05-17

## 2019-05-17 DIAGNOSIS — E34.9 TESTOSTERONE DEFICIENCY: ICD-10-CM

## 2019-05-17 PROCEDURE — 96372 THER/PROPH/DIAG INJ SC/IM: CPT | Performed by: NURSE PRACTITIONER

## 2019-05-17 RX ADMIN — TESTOSTERONE CYPIONATE 70 MG: 200 INJECTION, SOLUTION INTRAMUSCULAR at 16:46

## 2019-05-23 ENCOUNTER — LAB (OUTPATIENT)
Dept: LAB | Facility: OTHER | Age: 79
End: 2019-05-23

## 2019-05-23 DIAGNOSIS — E23.0 HYPOGONADOTROPIC HYPOGONADISM (HCC): Chronic | ICD-10-CM

## 2019-05-23 DIAGNOSIS — E03.8 CENTRAL HYPOTHYROIDISM: Chronic | ICD-10-CM

## 2019-05-23 LAB
ALBUMIN SERPL-MCNC: 4.3 G/DL (ref 3.5–5)
ALBUMIN/GLOB SERPL: 1.5 G/DL (ref 1.1–1.8)
ALP SERPL-CCNC: 58 U/L (ref 38–126)
ALT SERPL W P-5'-P-CCNC: 41 U/L
ANION GAP SERPL CALCULATED.3IONS-SCNC: 9 MMOL/L (ref 5–15)
AST SERPL-CCNC: 41 U/L (ref 17–59)
BILIRUB SERPL-MCNC: 1 MG/DL (ref 0.2–1.3)
BUN BLD-MCNC: 19 MG/DL (ref 7–23)
BUN/CREAT SERPL: 18.4 (ref 7–25)
CALCIUM SPEC-SCNC: 9.6 MG/DL (ref 8.4–10.2)
CHLORIDE SERPL-SCNC: 104 MMOL/L (ref 101–112)
CO2 SERPL-SCNC: 28 MMOL/L (ref 22–30)
CREAT BLD-MCNC: 1.03 MG/DL (ref 0.7–1.3)
DEPRECATED RDW RBC AUTO: 44.9 FL (ref 37–54)
EOSINOPHIL # BLD MANUAL: 0.23 10*3/MM3 (ref 0–0.4)
EOSINOPHIL NFR BLD MANUAL: 4 % (ref 0.3–6.2)
ERYTHROCYTE [DISTWIDTH] IN BLOOD BY AUTOMATED COUNT: 12.9 % (ref 12.3–15.4)
GFR SERPL CREATININE-BSD FRML MDRD: 70 ML/MIN/1.73 (ref 42–98)
GLOBULIN UR ELPH-MCNC: 2.9 GM/DL (ref 2.3–3.5)
GLUCOSE BLD-MCNC: 106 MG/DL (ref 70–99)
HCT VFR BLD AUTO: 53 % (ref 37.5–51)
HGB BLD-MCNC: 18.2 G/DL (ref 13–17.7)
LYMPHOCYTES # BLD MANUAL: 1.55 10*3/MM3 (ref 0.7–3.1)
LYMPHOCYTES NFR BLD MANUAL: 14 % (ref 5–12)
LYMPHOCYTES NFR BLD MANUAL: 27 % (ref 19.6–45.3)
MCH RBC QN AUTO: 33.2 PG (ref 26.6–33)
MCHC RBC AUTO-ENTMCNC: 34.3 G/DL (ref 31.5–35.7)
MCV RBC AUTO: 96.5 FL (ref 79–97)
MONOCYTES # BLD AUTO: 0.8 10*3/MM3 (ref 0.1–0.9)
NEUTROPHILS # BLD AUTO: 3.15 10*3/MM3 (ref 1.7–7)
NEUTROPHILS NFR BLD MANUAL: 55 % (ref 42.7–76)
PLATELET # BLD AUTO: 175 10*3/MM3 (ref 140–450)
PMV BLD AUTO: 9.7 FL (ref 6–12)
POTASSIUM BLD-SCNC: 4.1 MMOL/L (ref 3.4–5)
PROLACTIN SERPL-MCNC: 68 NG/ML (ref 4.04–15.2)
PROT SERPL-MCNC: 7.2 G/DL (ref 6.3–8.6)
RBC # BLD AUTO: 5.49 10*6/MM3 (ref 4.14–5.8)
RBC MORPH BLD: NORMAL
SMALL PLATELETS BLD QL SMEAR: ADEQUATE
SODIUM BLD-SCNC: 141 MMOL/L (ref 137–145)
T4 FREE SERPL-MCNC: 0.88 NG/DL (ref 0.93–1.7)
TESTOST SERPL-MCNC: 462 NG/DL (ref 193–740)
WBC MORPH BLD: NORMAL
WBC NRBC COR # BLD: 5.73 10*3/MM3 (ref 3.4–10.8)

## 2019-05-23 PROCEDURE — 84403 ASSAY OF TOTAL TESTOSTERONE: CPT | Performed by: NURSE PRACTITIONER

## 2019-05-23 PROCEDURE — 36415 COLL VENOUS BLD VENIPUNCTURE: CPT | Performed by: NURSE PRACTITIONER

## 2019-05-23 PROCEDURE — 82672 ASSAY OF ESTROGEN: CPT | Performed by: NURSE PRACTITIONER

## 2019-05-23 PROCEDURE — 85025 COMPLETE CBC W/AUTO DIFF WBC: CPT | Performed by: NURSE PRACTITIONER

## 2019-05-23 PROCEDURE — 80053 COMPREHEN METABOLIC PANEL: CPT | Performed by: NURSE PRACTITIONER

## 2019-05-23 PROCEDURE — 84439 ASSAY OF FREE THYROXINE: CPT | Performed by: NURSE PRACTITIONER

## 2019-05-23 PROCEDURE — 84146 ASSAY OF PROLACTIN: CPT | Performed by: NURSE PRACTITIONER

## 2019-05-25 LAB — ESTROGEN SERPL-MCNC: 148 PG/ML (ref 40–115)

## 2019-05-28 ENCOUNTER — TELEPHONE (OUTPATIENT)
Dept: ENDOCRINOLOGY | Facility: CLINIC | Age: 79
End: 2019-05-28

## 2019-05-28 DIAGNOSIS — R79.89 ELEVATED PROLACTIN LEVEL: Primary | ICD-10-CM

## 2019-05-28 NOTE — TELEPHONE ENCOUNTER
----- Message from DEEPA Olmos sent at 5/28/2019  2:57 PM CDT -----  Please let him know the prolactin level has increased we need a MRI of the pituitary for elevated prolactin

## 2019-05-30 ENCOUNTER — CLINICAL SUPPORT (OUTPATIENT)
Dept: FAMILY MEDICINE CLINIC | Facility: CLINIC | Age: 79
End: 2019-05-30

## 2019-05-30 ENCOUNTER — OFFICE VISIT (OUTPATIENT)
Dept: ENDOCRINOLOGY | Facility: CLINIC | Age: 79
End: 2019-05-30

## 2019-05-30 VITALS
HEART RATE: 55 BPM | BODY MASS INDEX: 29.4 KG/M2 | HEIGHT: 71 IN | SYSTOLIC BLOOD PRESSURE: 140 MMHG | DIASTOLIC BLOOD PRESSURE: 79 MMHG | OXYGEN SATURATION: 96 % | WEIGHT: 210 LBS

## 2019-05-30 DIAGNOSIS — E03.8 CENTRAL HYPOTHYROIDISM: Chronic | ICD-10-CM

## 2019-05-30 DIAGNOSIS — E22.1 HYPERPROLACTINEMIA (HCC): ICD-10-CM

## 2019-05-30 DIAGNOSIS — Z98.890 HISTORY OF PITUITARY SURGERY: Chronic | ICD-10-CM

## 2019-05-30 DIAGNOSIS — E34.9 TESTOSTERONE DEFICIENCY: ICD-10-CM

## 2019-05-30 DIAGNOSIS — E23.0 HYPOGONADOTROPIC HYPOGONADISM (HCC): Primary | Chronic | ICD-10-CM

## 2019-05-30 DIAGNOSIS — E23.0 PANHYPOPITUITARISM (HCC): ICD-10-CM

## 2019-05-30 PROCEDURE — 99214 OFFICE O/P EST MOD 30 MIN: CPT | Performed by: NURSE PRACTITIONER

## 2019-05-30 PROCEDURE — 96372 THER/PROPH/DIAG INJ SC/IM: CPT | Performed by: NURSE PRACTITIONER

## 2019-05-30 RX ORDER — LEVOTHYROXINE SODIUM 0.1 MG/1
100 TABLET ORAL DAILY
Qty: 30 TABLET | Refills: 11 | Status: SHIPPED | OUTPATIENT
Start: 2019-05-30 | End: 2019-11-18 | Stop reason: SDUPTHER

## 2019-05-30 RX ADMIN — TESTOSTERONE CYPIONATE 70 MG: 200 INJECTION, SOLUTION INTRAMUSCULAR at 10:04

## 2019-05-30 NOTE — PROGRESS NOTES
Darlin Robin is a 78 y.o. male. he is here today for follow-up.    History of Present Illness       Primary Care Provider     Yina Daniels APRN        Hypopituitarism     Context, 2.8 cm pituitary adenoma     Alleviating , Hypophysectomy July 26, 2018      Timing, constant     Quality , controlled on  hormone replacement     Severity, moderate     Symptoms, fatigue     Central hypothyroidism     Currently on Levothyroxine 88 mcg daily     Hypogonadism     Currently taking 40 mg IM weekly         Evaluation history:        TSH   Date Value Ref Range Status   01/07/2019 2.39 0.36 - 3.74 u[iU]/mL Final            Free T4   Date Value Ref Range Status   04/15/2019 0.81 (L) 0.93 - 1.70 ng/dL Final              The following portions of the patient's history were reviewed and updated as appropriate:   Past Medical History:   Diagnosis Date   • Essential hypertension 12/9/2018   • History of pituitary surgery 12/9/2018   • 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     • hydrochlorothiazide (HYDRODIURIL) 25 MG tablet Take 50 mg by mouth Daily.     • hydrocortisone (CORTEF) 10 MG tablet Take 1 tablet by mouth Daily. Up to 1 tab po bid 60 tablet 11   • hydrocortisone (CORTEF) 5 MG tablet Take 1 tablet by mouth Daily. Up to 2 tabs po bid 120 tablet 5   • losartan (COZAAR) 100 MG tablet Take 100 mg by mouth Daily.     • rosuvastatin (CRESTOR) 5 MG tablet Take 5 mg by mouth Daily.     • testosterone cypionate (DEPO-TESTOSTERONE) 100 MG/ML solution injection Inject 0.4 mL into the appropriate muscle as directed by prescriber 1 (One) Time Per Week for 28 days. 4 mL 0   •  levothyroxine (SYNTHROID) 100 MCG tablet Take 1 tablet by mouth Daily. 30 tablet 11     Current Facility-Administered Medications   Medication Dose Route Frequency Provider Last Rate Last Dose   • Testosterone Cypionate (DEPOTESTOTERONE CYPIONATE) injection 70 mg  70 mg Intramuscular Weekly Murdock, DEEPA Crespo   70 mg at 05/30/19 1004     Allergies   Allergen Reactions   • Donepezil Other (See Comments) and Unknown (See Comments)     Bad dreams  Bad dreams     • Naproxen Sodium Palpitations     Makes   Blood pressure     Social History     Socioeconomic History   • Marital status: Unknown     Spouse name: Not on file   • Number of children: Not on file   • Years of education: Not on file   • Highest education level: Not on file   Tobacco Use   • Smoking status: Never Smoker   • Smokeless tobacco: Never Used   Substance and Sexual Activity   • Alcohol use: No     Frequency: Never   • Drug use: No   • Sexual activity: No       Review of Systems  Review of Systems   Constitutional: Negative for activity change, appetite change, diaphoresis and fatigue.   HENT: Negative for facial swelling, sneezing, sore throat, tinnitus, trouble swallowing and voice change.    Eyes: Negative for photophobia, pain, discharge, redness, itching and visual disturbance.   Respiratory: Negative for apnea, cough, choking, chest tightness and shortness of breath.    Cardiovascular: Negative for chest pain, palpitations and leg swelling.   Gastrointestinal: Negative for abdominal distention, abdominal pain, constipation, diarrhea, nausea and vomiting.   Endocrine: Negative for cold intolerance, heat intolerance, polydipsia, polyphagia and polyuria.   Genitourinary: Negative for difficulty urinating, dysuria, frequency, hematuria and urgency.   Musculoskeletal: Negative for arthralgias, back pain, gait problem, joint swelling, myalgias, neck pain and neck stiffness.   Skin: Negative for color change, pallor, rash and wound.   Neurological:  "Negative for dizziness, tremors, weakness, light-headedness, numbness and headaches.   Hematological: Negative for adenopathy. Does not bruise/bleed easily.   Psychiatric/Behavioral: Negative for behavioral problems, confusion and sleep disturbance.        Objective    /79 (BP Location: Right arm)   Pulse 55   Ht 180.3 cm (71\")   Wt 95.3 kg (210 lb)   SpO2 96%   BMI 29.29 kg/m²   Physical Exam   Constitutional: He is oriented to person, place, and time. He appears well-developed and well-nourished. No distress.   HENT:   Head: Normocephalic and atraumatic.   Right Ear: External ear normal.   Left Ear: External ear normal.   Nose: Nose normal.   Eyes: Conjunctivae and EOM are normal. Pupils are equal, round, and reactive to light.   Neck: Normal range of motion. Neck supple. No tracheal deviation present. No thyromegaly present.   Cardiovascular: Normal rate, regular rhythm and normal heart sounds.   No murmur heard.  Pulmonary/Chest: Effort normal and breath sounds normal. No respiratory distress. He has no wheezes.   Abdominal: Soft. Bowel sounds are normal. There is no tenderness. There is no rebound and no guarding.   Musculoskeletal: Normal range of motion. He exhibits no edema, tenderness or deformity.   Neurological: He is alert and oriented to person, place, and time. No cranial nerve deficit.   Skin: Skin is warm and dry. No rash noted.   Psychiatric: He has a normal mood and affect. His behavior is normal. Judgment and thought content normal.       Lab Review  Glucose (mg/dL)   Date Value   05/23/2019 106 (H)   04/15/2019 114 (H)     Sodium   Date Value   05/23/2019 141 mmol/L   04/15/2019 141 mmol/L   01/07/2019 141 mmol/L   07/27/2018 138 MMOL/L   05/25/2018 139 mmol/L   01/29/2018 136 mmol/L     Potassium   Date Value   05/23/2019 4.1 mmol/L   04/15/2019 4.2 mmol/L   01/07/2019 3.8 mmol/L   07/27/2018 3.9 MMOL/L   05/25/2018 3.7 mmol/L   01/29/2018 4.2 mmol/L     Chloride   Date Value "   05/23/2019 104 mmol/L   04/15/2019 103 mmol/L   01/07/2019 104 mmol/L   07/27/2018 98 MMOL/L   05/25/2018 101 mmol/L   01/29/2018 100 mmol/L     CO2   Date Value   05/23/2019 28.0 mmol/L   04/15/2019 30.0 mmol/L   07/27/2018 28 MMOL/L     BUN   Date Value   05/23/2019 19 mg/dL   04/15/2019 20 mg/dL   01/07/2019 20 mg/dL (H)   07/27/2018 20 MG/DL   05/25/2018 23 mg/dL (H)     Creatinine   Date Value   05/23/2019 1.03 mg/dL   04/15/2019 1.11 mg/dL   01/07/2019 1.1 mg/dL   07/27/2018 1.0 MG/DL   05/25/2018 1.1 mg/dL   01/29/2018 1.1 mg/dL     Triglycerides (mg/dL)   Date Value   01/07/2019 314 (H)   05/25/2018 466 (H)   01/29/2018 491 (H)     LDL Cholesterol  (mg/dL)   Date Value   01/07/2019 103 (H)   05/25/2018 94   01/29/2018 92       Assessment/Plan      1. Hypogonadotropic hypogonadism (CMS/HCC)    2. Central hypothyroidism    3. Panhypopituitarism (CMS/HCC)    4. History of pituitary surgery    5. Hyperprolactinemia (CMS/HCC)    .    Medications prescribed:  Outpatient Encounter Medications as of 5/30/2019   Medication Sig Dispense Refill   • ferrous gluconate 324 (37.5 Fe) MG tablet tablet Take 324 mg by mouth Daily With Breakfast.     • hydrochlorothiazide (HYDRODIURIL) 25 MG tablet Take 50 mg by mouth Daily.     • hydrocortisone (CORTEF) 10 MG tablet Take 1 tablet by mouth Daily. Up to 1 tab po bid 60 tablet 11   • hydrocortisone (CORTEF) 5 MG tablet Take 1 tablet by mouth Daily. Up to 2 tabs po bid 120 tablet 5   • losartan (COZAAR) 100 MG tablet Take 100 mg by mouth Daily.     • rosuvastatin (CRESTOR) 5 MG tablet Take 5 mg by mouth Daily.     • testosterone cypionate (DEPO-TESTOSTERONE) 100 MG/ML solution injection Inject 0.4 mL into the appropriate muscle as directed by prescriber 1 (One) Time Per Week for 28 days. 4 mL 0   • [DISCONTINUED] levothyroxine (SYNTHROID) 75 MCG tablet Take 1 tablet by mouth Daily. 30 tablet 11   • [DISCONTINUED] levothyroxine (SYNTHROID) 88 MCG tablet Take 1 tablet by mouth  Daily. 30 tablet 11   • levothyroxine (SYNTHROID) 100 MCG tablet Take 1 tablet by mouth Daily. 30 tablet 11     Facility-Administered Encounter Medications as of 5/30/2019   Medication Dose Route Frequency Provider Last Rate Last Dose   • Testosterone Cypionate (DEPOTESTOTERONE CYPIONATE) injection 70 mg  70 mg Intramuscular Weekly Murdock, DEEPA Crespo   70 mg at 05/30/19 1004       Orders placed during this encounter include:  Orders Placed This Encounter   Procedures   • Estrogens, Total     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           Central hypothyroidism      On levothyroxine 75 mcgs daily      Component      Latest Ref Rng & Units 4/15/2019   Free T4      0.93 - 1.70 ng/dL 0.81 (L)      Increased to 88 mcg      Component      Latest Ref Rng & Units 5/23/2019   Free T4      0.93 - 1.70 ng/dL 0.88 (L)        increase to 100 mcg   --     Hypogonadotropic Hypogonadism     Testosterone cypionate start as 70 mg IM weekly ----            Having breast tenderness         Component      Latest Ref Rng & Units 4/15/2019   PSA      0.0 - 4.0 ng/mL 0.4   Reflex       Comment   Testosterone, Total      193.00 - 740.00 ng/dL 561.00            Decrease to 40 mg IM weekly             Component      Latest Ref Rng & Units 5/23/2019   Estrogen      40 - 115 pg/mL 148 (H)       Stop testosterone for two weeks    Recheck estrogen iin 2 weeks     We need to see if the testosterone is driving the estrogen elevation     --     Was previously on hydrocortisone for adrenal insufficiency that I stopped and demonstrated he has intact adrenal axis     --     Normal IGF1        Prolactin         Component      Latest Ref Rng & Units 5/23/2019   Prolactin      4.04 - 15.20 ng/mL 68.00 (H)        alex for MRI of the pituitary     4. Follow-up: Return in about 8 weeks (around 7/25/2019).

## 2019-06-06 ENCOUNTER — HOSPITAL ENCOUNTER (OUTPATIENT)
Dept: MRI IMAGING | Facility: HOSPITAL | Age: 79
Discharge: HOME | End: 2019-06-06
Admitting: NURSE PRACTITIONER

## 2019-06-06 DIAGNOSIS — R79.89 ELEVATED PROLACTIN LEVEL: ICD-10-CM

## 2019-06-06 PROCEDURE — 70553 MRI BRAIN STEM W/O & W/DYE: CPT

## 2019-06-06 PROCEDURE — A9576 INJ PROHANCE MULTIPACK: HCPCS | Performed by: NURSE PRACTITIONER

## 2019-06-06 PROCEDURE — 25010000002 GADOTERIDOL PER 1 ML: Performed by: NURSE PRACTITIONER

## 2019-06-06 RX ADMIN — GADOTERIDOL 20 ML: 279.3 INJECTION, SOLUTION INTRAVENOUS at 14:15

## 2019-06-12 RX ORDER — CABERGOLINE 0.5 MG/1
TABLET ORAL
Qty: 8 TABLET | Refills: 11 | Status: SHIPPED | OUTPATIENT
Start: 2019-06-12 | End: 2019-06-13 | Stop reason: SDUPTHER

## 2019-06-13 ENCOUNTER — TELEPHONE (OUTPATIENT)
Dept: FAMILY MEDICINE CLINIC | Facility: CLINIC | Age: 79
End: 2019-06-13

## 2019-06-13 RX ORDER — CABERGOLINE 0.5 MG/1
TABLET ORAL
Qty: 8 TABLET | Refills: 11 | Status: SHIPPED | OUTPATIENT
Start: 2019-06-13 | End: 2019-10-25 | Stop reason: SDUPTHER

## 2019-06-13 NOTE — TELEPHONE ENCOUNTER
Pt Said Dostinex 0.5 mg  Did not go through to [facility]  Pharmacy needs to be resent

## 2019-06-13 NOTE — TELEPHONE ENCOUNTER
He called today and said Oseas was going to send in pres for him yesterday to [facility] in [place]- he was there at 5:00 and they had never received pres. The pres was sent but transmission failed and pharmacy did not receive it- it needs to be resent or called into the pharmacy. Please call patient back at [phone] so he will know when pharmacy receives it.

## 2019-06-14 NOTE — TELEPHONE ENCOUNTER
Called pharmacy and they had the RX but they just didn't write the patients name on it, after we already called up there twice and the patients been there twice. But now they said they have it. But they don't have the medication there. They are going to call around there other stores to see if they can get it and then call the pt when its there. I called the pt and he is aware of everything.

## 2019-06-17 PROCEDURE — 36415 COLL VENOUS BLD VENIPUNCTURE: CPT | Performed by: NURSE PRACTITIONER

## 2019-06-17 PROCEDURE — 82672 ASSAY OF ESTROGEN: CPT | Performed by: NURSE PRACTITIONER

## 2019-06-20 LAB — ESTROGEN SERPL-MCNC: 86 PG/ML (ref 40–115)

## 2019-06-24 ENCOUNTER — TELEPHONE (OUTPATIENT)
Dept: FAMILY MEDICINE CLINIC | Facility: CLINIC | Age: 79
End: 2019-06-24

## 2019-06-24 ENCOUNTER — TELEPHONE (OUTPATIENT)
Dept: ENDOCRINOLOGY | Facility: CLINIC | Age: 79
End: 2019-06-24

## 2019-06-24 DIAGNOSIS — I10 ESSENTIAL HYPERTENSION: Primary | ICD-10-CM

## 2019-06-24 DIAGNOSIS — E23.0 PANHYPOPITUITARISM (HCC): ICD-10-CM

## 2019-06-24 DIAGNOSIS — E03.8 CENTRAL HYPOTHYROIDISM: ICD-10-CM

## 2019-06-24 DIAGNOSIS — D49.7 PITUITARY TUMOR: ICD-10-CM

## 2019-06-24 DIAGNOSIS — E55.9 VITAMIN D DEFICIENCY: ICD-10-CM

## 2019-06-24 DIAGNOSIS — E23.0 HYPOGONADOTROPIC HYPOGONADISM (HCC): ICD-10-CM

## 2019-06-24 NOTE — TELEPHONE ENCOUNTER
I just sent you a message about him under lab results    The estrogen has come down but we still want him to stay off the testosterone     We want to see if the medication we started to lower the prolactin will raise the testosterone     Labs before next visit

## 2019-06-24 NOTE — TELEPHONE ENCOUNTER
Patient called and states that he was to stop his shots for two weeks. He states he had his labs done a week ago. He needs to know if he is supposed to resume his shots? Call him at [phone]

## 2019-06-24 NOTE — TELEPHONE ENCOUNTER
Patient called and states that he was to stop his shots for two weeks. He states he had his labs done a week ago. He needs to know if he is supposed to resume his shots? Call him at [phone]     Please advise .

## 2019-06-24 NOTE — TELEPHONE ENCOUNTER
----- Message from DEEPA Olmos sent at 6/24/2019  9:39 AM CDT -----  Let him know the estrogen level is down to normal but we want him to stay off the testosterone and see if the medication for the high prolactin will bring up the testosterone

## 2019-07-23 LAB
ALBUMIN SERPL-MCNC: 4.3 G/DL (ref 3.5–5)
ALBUMIN/GLOB SERPL: 1.4 G/DL (ref 1.1–1.8)
ALP SERPL-CCNC: 60 U/L (ref 38–126)
ALT SERPL W P-5'-P-CCNC: 44 U/L
ANION GAP SERPL CALCULATED.3IONS-SCNC: 8 MMOL/L (ref 5–15)
AST SERPL-CCNC: 43 U/L (ref 17–59)
BILIRUB SERPL-MCNC: 0.8 MG/DL (ref 0.2–1.3)
BUN BLD-MCNC: 24 MG/DL (ref 7–23)
BUN/CREAT SERPL: 20.7 (ref 7–25)
CALCIUM SPEC-SCNC: 9.6 MG/DL (ref 8.4–10.2)
CHLORIDE SERPL-SCNC: 104 MMOL/L (ref 101–112)
CO2 SERPL-SCNC: 32 MMOL/L (ref 22–30)
CREAT BLD-MCNC: 1.16 MG/DL (ref 0.7–1.3)
DEPRECATED RDW RBC AUTO: 43.2 FL (ref 37–54)
EOSINOPHIL # BLD MANUAL: 0.26 10*3/MM3 (ref 0–0.4)
EOSINOPHIL NFR BLD MANUAL: 5 % (ref 0.3–6.2)
ERYTHROCYTE [DISTWIDTH] IN BLOOD BY AUTOMATED COUNT: 13 % (ref 12.3–15.4)
GFR SERPL CREATININE-BSD FRML MDRD: 61 ML/MIN/1.73 (ref 42–98)
GLOBULIN UR ELPH-MCNC: 3 GM/DL (ref 2.3–3.5)
GLUCOSE BLD-MCNC: 111 MG/DL (ref 70–99)
HCT VFR BLD AUTO: 48.7 % (ref 37.5–51)
HGB BLD-MCNC: 17.1 G/DL (ref 13–17.7)
LYMPHOCYTES # BLD MANUAL: 1.66 10*3/MM3 (ref 0.7–3.1)
LYMPHOCYTES NFR BLD MANUAL: 32 % (ref 19.6–45.3)
LYMPHOCYTES NFR BLD MANUAL: 9 % (ref 5–12)
MCH RBC QN AUTO: 32.8 PG (ref 26.6–33)
MCHC RBC AUTO-ENTMCNC: 35.1 G/DL (ref 31.5–35.7)
MCV RBC AUTO: 93.3 FL (ref 79–97)
MONOCYTES # BLD AUTO: 0.47 10*3/MM3 (ref 0.1–0.9)
NEUTROPHILS # BLD AUTO: 2.8 10*3/MM3 (ref 1.7–7)
NEUTROPHILS NFR BLD MANUAL: 54 % (ref 42.7–76)
PLATELET # BLD AUTO: 143 10*3/MM3 (ref 140–450)
PMV BLD AUTO: 9.9 FL (ref 6–12)
POTASSIUM BLD-SCNC: 4.3 MMOL/L (ref 3.4–5)
PROT SERPL-MCNC: 7.3 G/DL (ref 6.3–8.6)
RBC # BLD AUTO: 5.22 10*6/MM3 (ref 4.14–5.8)
RBC MORPH BLD: NORMAL
SMALL PLATELETS BLD QL SMEAR: NORMAL
SODIUM BLD-SCNC: 144 MMOL/L (ref 137–145)
WBC MORPH BLD: NORMAL
WBC NRBC COR # BLD: 5.19 10*3/MM3 (ref 3.4–10.8)

## 2019-07-23 PROCEDURE — 85025 COMPLETE CBC W/AUTO DIFF WBC: CPT | Performed by: NURSE PRACTITIONER

## 2019-07-23 PROCEDURE — 84146 ASSAY OF PROLACTIN: CPT | Performed by: NURSE PRACTITIONER

## 2019-07-23 PROCEDURE — 36415 COLL VENOUS BLD VENIPUNCTURE: CPT | Performed by: NURSE PRACTITIONER

## 2019-07-23 PROCEDURE — 82306 VITAMIN D 25 HYDROXY: CPT | Performed by: NURSE PRACTITIONER

## 2019-07-23 PROCEDURE — 82607 VITAMIN B-12: CPT | Performed by: NURSE PRACTITIONER

## 2019-07-23 PROCEDURE — 84443 ASSAY THYROID STIM HORMONE: CPT | Performed by: NURSE PRACTITIONER

## 2019-07-23 PROCEDURE — 84403 ASSAY OF TOTAL TESTOSTERONE: CPT | Performed by: NURSE PRACTITIONER

## 2019-07-23 PROCEDURE — 82670 ASSAY OF TOTAL ESTRADIOL: CPT | Performed by: NURSE PRACTITIONER

## 2019-07-23 PROCEDURE — 80053 COMPREHEN METABOLIC PANEL: CPT | Performed by: NURSE PRACTITIONER

## 2019-07-24 LAB
25(OH)D3 SERPL-MCNC: 44.5 NG/ML (ref 30–100)
ESTRADIOL SERPL HS-MCNC: <5 PG/ML
PROLACTIN SERPL-MCNC: 0.33 NG/ML (ref 4.04–15.2)
TESTOST SERPL-MCNC: 66.6 NG/DL (ref 193–740)
TSH SERPL DL<=0.05 MIU/L-ACNC: 0.98 MIU/ML (ref 0.27–4.2)
VIT B12 BLD-MCNC: 1089 PG/ML (ref 211–946)

## 2019-07-25 ENCOUNTER — OFFICE VISIT (OUTPATIENT)
Dept: ENDOCRINOLOGY | Facility: CLINIC | Age: 79
End: 2019-07-25

## 2019-07-25 VITALS
WEIGHT: 204 LBS | SYSTOLIC BLOOD PRESSURE: 124 MMHG | BODY MASS INDEX: 28.56 KG/M2 | DIASTOLIC BLOOD PRESSURE: 82 MMHG | HEIGHT: 71 IN | HEART RATE: 62 BPM

## 2019-07-25 DIAGNOSIS — E22.1 HYPERPROLACTINEMIA (HCC): ICD-10-CM

## 2019-07-25 DIAGNOSIS — E23.0 PANHYPOPITUITARISM (HCC): ICD-10-CM

## 2019-07-25 DIAGNOSIS — E23.0 HYPOGONADOTROPIC HYPOGONADISM (HCC): ICD-10-CM

## 2019-07-25 DIAGNOSIS — E03.8 CENTRAL HYPOTHYROIDISM: Primary | ICD-10-CM

## 2019-07-25 PROCEDURE — 99214 OFFICE O/P EST MOD 30 MIN: CPT | Performed by: NURSE PRACTITIONER

## 2019-07-25 NOTE — PROGRESS NOTES
Subjective    Simeon Robin is a 78 y.o. male. he is here today for follow-up.    History of Present Illness             Primary Care Provider     Yina Daniels APRN        Hypopituitarism      1.5 cm pituitary adenoma        Previous   Context, 2.8 cm pituitary adenoma     , Hypophysectomy July 26, 2018       -------------------------------------------------------------------------  Current     Alleviating - Dostinex      Timing, constant     Quality , controlled on  hormone replacement     Severity, moderate     Symptoms, fatigue     Central hypothyroidism     Currently on Levothyroxine 88 mcg daily     Hypogonadism     Currently taking 40 mg IM weekly             Procedure: MRI head with and without contrast with pituitary  protocol on 6/6/2019     CLINICAL INDICATION: Elevated prolactin level, history of brain  tumor removed one year ago     TECHNIQUE: Multiplanar, multisequence MR images are obtained  throughout the head both prior to and following the  administration of IV gadolinium contrast. Thin section pre and  post contrast imaging through the sella is performed.   This examination was performed on a 1.5 Amber magnet.     COMPARISON: None     FINDINGS: There is a 1.3 x 1.0 x 1.5 cm sellar and suprasellar  mass consistent with a pituitary macroadenoma. This approaches  but does not definitely contact or impinge upon the optic chiasm  at this time. Sagittal midline view shows an otherwise normal  appearance of the midline structures. There is a normal  appearance of the craniovertebral junction. Flow related signal  within the major intracranial vessels is preserved. There is no  hydrocephalus. There is mild generalized cerebral atrophy. There  is minimal T2 signal abnormality in the periventricular white  matter consistent with minimal chronic small vessel ischemic  changes. Diffusion weighted imaging shows no evidence of acute  infarct. There is enhancement of the macroadenoma that  appears  relatively homogeneous. There is no other mass and no mass effect  or midline shift. There is no other pathologic contrast  enhancement.     IMPRESSION:  1. 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  2. Mild atrophy and minimal chronic small vessel ischemic  changes.     Electronically signed by:  Aram Young  6/7/2019 12:03 AM  CDT Workstation: [id number]   Imaging     MRI Pituitary With & Without Contrast (Order: [id number]) - 6/6/2019   Reprint Order Requisition     MRI Pituitary With & Without Contrast (Order #[id number]) on 6/6/19   Result Notes for MRI Pituitary With & Without Contrast     Notes recorded by Oseas Huizar APRN on 6/12/2019 at 9:32 AM CDT  Discussed results with patient;medication called into [facility]  ------    Notes recorded by Topher De Luna MD on 6/11/2019 at 12:21 PM CDT  Please tell him that from what we understand he was told to have a nonfunctional tumor but it is possible that it is making prolactin .   It is also possible that it is truly nonfunctional and the prolactin elevation is stalk effect but it is too high in my experience for this.     I think we should start cabergoline 0.5 mg tabs, half a tab on Monday and Thursday. Repeat labs with appt every 2 months.     Repeat MRI in 6 months. If it shrinks w prolactin normalization  it was a prolactinoma. 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     • hydrochlorothiazide (HYDRODIURIL) 25 MG tablet Take 50 mg by mouth Daily.     • hydrocortisone (CORTEF) 10 MG tablet Take 1 tablet by mouth Daily. Up to 1 tab po bid 60 tablet 11   • hydrocortisone (CORTEF) 5 MG tablet Take 1 tablet by mouth Daily. Up to 2 tabs po bid 120 tablet 5   • levothyroxine (SYNTHROID) 100 MCG tablet Take 1 tablet by mouth Daily. 30 tablet 11   • losartan (COZAAR) 100 MG tablet Take 100 mg by mouth Daily.     • rosuvastatin (CRESTOR) 5 MG tablet Take 5 mg by mouth Daily.     • testosterone cypionate (DEPO-TESTOSTERONE) 100 MG/ML solution injection Inject 0.4 mL into the appropriate muscle as directed by prescriber 1 (One) Time Per Week for 28 days. 4 mL 0     Current Facility-Administered Medications   Medication Dose Route Frequency Provider Last Rate Last Dose   • Testosterone Cypionate (DEPOTESTOTERONE CYPIONATE) injection 70 mg  70 mg Intramuscular Weekly Murdock, DEEPA Crespo   70 mg at 05/30/19 1004     Allergies   Allergen Reactions   • Donepezil Other (See Comments) and Unknown (See Comments)     Bad dreams  Bad dreams     • Naproxen Sodium Palpitations     Makes   Blood pressure     Social History     Socioeconomic History   • Marital status: Unknown     Spouse name: Not on file   • Number of children: Not on file   • Years of education: Not on file   • Highest education level: Not on file   Tobacco Use   • Smoking status: Never Smoker   • Smokeless tobacco: Never Used   Substance and Sexual Activity   • Alcohol use: No     Frequency: Never   • Drug use: No   • Sexual activity: No       Review of Systems  Review of Systems   Constitutional: Negative for activity change, appetite change, diaphoresis and fatigue.   HENT: Negative for  "facial swelling, sneezing, sore throat, tinnitus, trouble swallowing and voice change.    Eyes: Negative for photophobia, pain, discharge, redness, itching and visual disturbance.   Respiratory: Negative for apnea, cough, choking, chest tightness and shortness of breath.    Cardiovascular: Negative for chest pain, palpitations and leg swelling.   Gastrointestinal: Negative for abdominal distention, abdominal pain, constipation, diarrhea, nausea and vomiting.   Endocrine: Negative for cold intolerance, heat intolerance, polydipsia, polyphagia and polyuria.   Genitourinary: Negative for difficulty urinating, dysuria, frequency, hematuria and urgency.   Musculoskeletal: Negative for arthralgias, back pain, gait problem, joint swelling, myalgias, neck pain and neck stiffness.   Skin: Negative for color change, pallor, rash and wound.   Neurological: Negative for dizziness, tremors, weakness, light-headedness, numbness and headaches.   Hematological: Negative for adenopathy. Does not bruise/bleed easily.   Psychiatric/Behavioral: Negative for behavioral problems, confusion and sleep disturbance.        Objective    /82 (BP Location: Left arm, Patient Position: Sitting, Cuff Size: Adult)   Pulse 62   Ht 180.3 cm (71\")   Wt 92.5 kg (204 lb)   BMI 28.45 kg/m²   Physical Exam   Constitutional: He is oriented to person, place, and time. He appears well-developed and well-nourished. No distress.   HENT:   Head: Normocephalic and atraumatic.   Right Ear: External ear normal.   Left Ear: External ear normal.   Nose: Nose normal.   Eyes: Conjunctivae and EOM are normal. Pupils are equal, round, and reactive to light.   Neck: Normal range of motion. Neck supple. No tracheal deviation present. No thyromegaly present.   Cardiovascular: Normal rate, regular rhythm and normal heart sounds.   No murmur heard.  Pulmonary/Chest: Effort normal and breath sounds normal. No respiratory distress. He has no wheezes.   Abdominal: " Soft. Bowel sounds are normal. There is no tenderness. There is no rebound and no guarding.   Musculoskeletal: Normal range of motion. He exhibits no edema, tenderness or deformity.   Neurological: He is alert and oriented to person, place, and time. No cranial nerve deficit.   Skin: Skin is warm and dry. No rash noted.   Psychiatric: He has a normal mood and affect. His behavior is normal. Judgment and thought content normal.       Lab Review  Glucose (mg/dL)   Date Value   07/23/2019 111 (H)   05/23/2019 106 (H)   04/15/2019 114 (H)     Sodium   Date Value   07/23/2019 144 mmol/L   05/23/2019 141 mmol/L   04/15/2019 141 mmol/L   01/07/2019 141 mmol/L   07/27/2018 138 MMOL/L   05/25/2018 139 mmol/L   01/29/2018 136 mmol/L     Potassium   Date Value   07/23/2019 4.3 mmol/L   05/23/2019 4.1 mmol/L   04/15/2019 4.2 mmol/L   01/07/2019 3.8 mmol/L   07/27/2018 3.9 MMOL/L   05/25/2018 3.7 mmol/L   01/29/2018 4.2 mmol/L     Chloride   Date Value   07/23/2019 104 mmol/L   05/23/2019 104 mmol/L   04/15/2019 103 mmol/L   01/07/2019 104 mmol/L   07/27/2018 98 MMOL/L   05/25/2018 101 mmol/L   01/29/2018 100 mmol/L     CO2   Date Value   07/23/2019 32.0 mmol/L (H)   05/23/2019 28.0 mmol/L   04/15/2019 30.0 mmol/L   07/27/2018 28 MMOL/L     BUN   Date Value   07/23/2019 24 mg/dL (H)   05/23/2019 19 mg/dL   04/15/2019 20 mg/dL   01/07/2019 20 mg/dL (H)   07/27/2018 20 MG/DL   05/25/2018 23 mg/dL (H)     Creatinine   Date Value   07/23/2019 1.16 mg/dL   05/23/2019 1.03 mg/dL   04/15/2019 1.11 mg/dL   01/07/2019 1.1 mg/dL   07/27/2018 1.0 MG/DL   05/25/2018 1.1 mg/dL   01/29/2018 1.1 mg/dL     Triglycerides (mg/dL)   Date Value   01/07/2019 314 (H)   05/25/2018 466 (H)   01/29/2018 491 (H)     LDL Cholesterol  (mg/dL)   Date Value   01/07/2019 103 (H)   05/25/2018 94   01/29/2018 92       Assessment/Plan      1. Central hypothyroidism    2. Hypogonadotropic hypogonadism (CMS/HCC)    3. Panhypopituitarism (CMS/HCC)    4.  Hyperprolactinemia (CMS/HCC)    .    Medications prescribed:  Outpatient Encounter Medications as of 7/25/2019   Medication Sig Dispense Refill   • cabergoline (DOSTINEX) 0.5 MG tablet Take 1/2 tablet on Monday and Thursday 8 tablet 11   • ferrous gluconate 324 (37.5 Fe) MG tablet tablet Take 324 mg by mouth Daily With Breakfast.     • hydrochlorothiazide (HYDRODIURIL) 25 MG tablet Take 50 mg by mouth Daily.     • hydrocortisone (CORTEF) 10 MG tablet Take 1 tablet by mouth Daily. Up to 1 tab po bid 60 tablet 11   • hydrocortisone (CORTEF) 5 MG tablet Take 1 tablet by mouth Daily. Up to 2 tabs po bid 120 tablet 5   • levothyroxine (SYNTHROID) 100 MCG tablet Take 1 tablet by mouth Daily. 30 tablet 11   • losartan (COZAAR) 100 MG tablet Take 100 mg by mouth Daily.     • rosuvastatin (CRESTOR) 5 MG tablet Take 5 mg by mouth Daily.     • testosterone cypionate (DEPO-TESTOSTERONE) 100 MG/ML solution injection Inject 0.4 mL into the appropriate muscle as directed by prescriber 1 (One) Time Per Week for 28 days. 4 mL 0     Facility-Administered Encounter Medications as of 7/25/2019   Medication Dose Route Frequency Provider Last Rate Last Dose   • Testosterone Cypionate (DEPOTESTOTERONE CYPIONATE) injection 70 mg  70 mg Intramuscular Weekly Murdock, DEEPA Crespo   70 mg at 05/30/19 1004       Orders placed during this encounter include:  No orders of the defined types were placed in this encounter.    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           Central hypothyroidism      On levothyroxine 75 mcgs daily      Component      Latest Ref Rng & Units 4/15/2019   Free T4      0.93 - 1.70 ng/dL 0.81 (L)                              --      Lab Results   Component Value Date    TSH 0.977 07/23/2019       On levothyroxine 88 mcg daily       -----------------------------           Hypogonadotropic Hypogonadism     Testosterone cypionate start as 70 mg IM weekly ---- stopped            Having breast tenderness          Component      Latest Ref Rng & Units 4/15/2019   PSA      0.0 - 4.0 ng/mL 0.4   Reflex       Comment   Testosterone, Total      193.00 - 740.00 ng/dL 561.00            Decrease to 40 mg IM weekly-- restart       Component      Latest Ref Rng & Units 7/23/2019   Testosterone, Total      193.00 - 740.00 ng/dL 66.60 (L)     --  Component      Latest Ref Rng & Units 7/23/2019   Estradiol      pg/mL <5.0        estrogen rise seems to be from the testosterone     Will restart at 40 mg IM       Was previously on hydrocortisone for adrenal insufficiency that I stopped and demonstrated he has intact adrenal axis     --     Normal IGF1    Elevated prolactin    pituitary tumor      Component      Latest Ref Rng & Units 5/25/2018 1/7/2019 5/23/2019 7/23/2019   Prolactin      4.04 - 15.20 ng/mL 28.73 (H) 21.12 (H) 68.00 (H) 0.33 (L)       dostinex 0.5 mg 1/2 tablet po twice a week -- decrease to once a week    He meet with neurosurgeon and states the tumor was never completely removed just shaved     He will see him again in 6 months           4. Follow-up: Return in about 3 months (around 10/25/2019) for Recheck.

## 2019-08-01 ENCOUNTER — CLINICAL SUPPORT (OUTPATIENT)
Dept: FAMILY MEDICINE CLINIC | Facility: CLINIC | Age: 79
End: 2019-08-01

## 2019-08-01 DIAGNOSIS — E34.9 TESTOSTERONE DEFICIENCY: ICD-10-CM

## 2019-08-01 PROCEDURE — 96372 THER/PROPH/DIAG INJ SC/IM: CPT | Performed by: NURSE PRACTITIONER

## 2019-08-01 RX ADMIN — TESTOSTERONE CYPIONATE 70 MG: 200 INJECTION, SOLUTION INTRAMUSCULAR at 11:00

## 2019-08-02 ENCOUNTER — TELEPHONE (OUTPATIENT)
Dept: FAMILY MEDICINE CLINIC | Facility: CLINIC | Age: 79
End: 2019-08-02

## 2019-08-02 RX ORDER — TESTOSTERONE CYPIONATE 100 MG/ML
40 INJECTION, SOLUTION INTRAMUSCULAR WEEKLY
Qty: 4 ML | Refills: 0 | Status: SHIPPED | OUTPATIENT
Start: 2019-08-02 | End: 2019-08-09

## 2019-08-02 NOTE — TELEPHONE ENCOUNTER
Patient has called to check on his RX for Testosterone. Call him at [phone]  [facility]        We have not prescribed this and in your note it says it was stopped???  If ok then just send.  Thanks, faheem

## 2019-08-02 NOTE — TELEPHONE ENCOUNTER
Patient has called to check on his RX for Testosterone. Call him at [phone]  [facility]

## 2019-08-08 ENCOUNTER — CLINICAL SUPPORT (OUTPATIENT)
Dept: FAMILY MEDICINE CLINIC | Facility: CLINIC | Age: 79
End: 2019-08-08

## 2019-08-08 DIAGNOSIS — E34.9 TESTOSTERONE DEFICIENCY: ICD-10-CM

## 2019-08-08 PROCEDURE — 96372 THER/PROPH/DIAG INJ SC/IM: CPT | Performed by: NURSE PRACTITIONER

## 2019-08-08 RX ADMIN — TESTOSTERONE CYPIONATE 70 MG: 200 INJECTION, SOLUTION INTRAMUSCULAR at 10:33

## 2019-08-09 ENCOUNTER — TELEPHONE (OUTPATIENT)
Dept: FAMILY MEDICINE CLINIC | Facility: CLINIC | Age: 79
End: 2019-08-09

## 2019-08-09 RX ORDER — TESTOSTERONE CYPIONATE 100 MG/ML
100 INJECTION, SOLUTION INTRAMUSCULAR WEEKLY
Qty: 2 ML | Refills: 5 | Status: SHIPPED | OUTPATIENT
Start: 2019-08-09 | End: 2019-08-22 | Stop reason: SDUPTHER

## 2019-08-09 RX ORDER — TESTOSTERONE CYPIONATE 100 MG/ML
100 INJECTION, SOLUTION INTRAMUSCULAR WEEKLY
Qty: 2 ML | Refills: 5 | Status: SHIPPED | OUTPATIENT
Start: 2019-08-09 | End: 2019-08-09 | Stop reason: SDUPTHER

## 2019-08-09 NOTE — TELEPHONE ENCOUNTER
He called about his testosterone injection pres-said was sent to [facility] in [place] but needs to be sent to [facility] in [place]-also said something about way pres was worded-may have question about it.He can be reached at [phone].    Can you help fix the rx please.  Thanks.

## 2019-08-09 NOTE — TELEPHONE ENCOUNTER
He called about his testosterone injection pres-said was sent to [facility] in [place] but needs to be sent to [facility] in [place]-also said something about way pres was worded-may have question about it.He can be reached at [phone].

## 2019-08-15 ENCOUNTER — TELEPHONE (OUTPATIENT)
Dept: FAMILY MEDICINE CLINIC | Facility: CLINIC | Age: 79
End: 2019-08-15

## 2019-08-21 RX ORDER — TESTOSTERONE CYPIONATE 100 MG/ML
100 INJECTION, SOLUTION INTRAMUSCULAR WEEKLY
Qty: 2 ML | Refills: 5 | Status: CANCELLED | OUTPATIENT
Start: 2019-08-21

## 2019-08-21 NOTE — TELEPHONE ENCOUNTER
Please refill          Needs testosterone refill sent to [facility] pharmacy. Thanks!

## 2019-08-22 RX ORDER — TESTOSTERONE CYPIONATE 100 MG/ML
100 INJECTION, SOLUTION INTRAMUSCULAR WEEKLY
Qty: 2 ML | Refills: 5 | Status: SHIPPED | OUTPATIENT
Start: 2019-08-22 | End: 2021-11-10

## 2019-08-30 ENCOUNTER — CLINICAL SUPPORT (OUTPATIENT)
Dept: FAMILY MEDICINE CLINIC | Facility: CLINIC | Age: 79
End: 2019-08-30

## 2019-08-30 DIAGNOSIS — E34.9 TESTOSTERONE DEFICIENCY: ICD-10-CM

## 2019-08-30 PROCEDURE — 96372 THER/PROPH/DIAG INJ SC/IM: CPT | Performed by: NURSE PRACTITIONER

## 2019-08-30 RX ADMIN — TESTOSTERONE CYPIONATE 100 MG: 100 INJECTION, SOLUTION INTRAMUSCULAR at 08:52

## 2019-09-17 RX ORDER — CABERGOLINE 0.5 MG/1
TABLET ORAL
Qty: 8 TABLET | Refills: 0 | OUTPATIENT
Start: 2019-09-17

## 2019-09-19 ENCOUNTER — CLINICAL SUPPORT (OUTPATIENT)
Dept: FAMILY MEDICINE CLINIC | Facility: CLINIC | Age: 79
End: 2019-09-19

## 2019-09-19 DIAGNOSIS — E34.9 TESTOSTERONE DEFICIENCY: ICD-10-CM

## 2019-09-19 PROCEDURE — 96372 THER/PROPH/DIAG INJ SC/IM: CPT | Performed by: NURSE PRACTITIONER

## 2019-09-19 RX ADMIN — TESTOSTERONE CYPIONATE 100 MG: 100 INJECTION, SOLUTION INTRAMUSCULAR at 08:37

## 2019-09-26 ENCOUNTER — CLINICAL SUPPORT (OUTPATIENT)
Dept: FAMILY MEDICINE CLINIC | Facility: CLINIC | Age: 79
End: 2019-09-26

## 2019-09-26 DIAGNOSIS — E23.0 HYPOGONADOTROPIC HYPOGONADISM (HCC): Chronic | ICD-10-CM

## 2019-09-26 PROCEDURE — 96372 THER/PROPH/DIAG INJ SC/IM: CPT | Performed by: NURSE PRACTITIONER

## 2019-09-26 RX ADMIN — TESTOSTERONE CYPIONATE 100 MG: 100 INJECTION, SOLUTION INTRAMUSCULAR at 09:19

## 2019-10-03 ENCOUNTER — CLINICAL SUPPORT (OUTPATIENT)
Dept: FAMILY MEDICINE CLINIC | Facility: CLINIC | Age: 79
End: 2019-10-03

## 2019-10-03 DIAGNOSIS — E34.9 TESTOSTERONE DEFICIENCY: ICD-10-CM

## 2019-10-03 PROCEDURE — 96372 THER/PROPH/DIAG INJ SC/IM: CPT | Performed by: NURSE PRACTITIONER

## 2019-10-03 RX ADMIN — TESTOSTERONE CYPIONATE 100 MG: 100 INJECTION, SOLUTION INTRAMUSCULAR at 09:05

## 2019-10-10 ENCOUNTER — CLINICAL SUPPORT (OUTPATIENT)
Dept: FAMILY MEDICINE CLINIC | Facility: CLINIC | Age: 79
End: 2019-10-10

## 2019-10-10 DIAGNOSIS — E34.9 TESTOSTERONE DEFICIENCY: ICD-10-CM

## 2019-10-10 PROCEDURE — 96372 THER/PROPH/DIAG INJ SC/IM: CPT | Performed by: NURSE PRACTITIONER

## 2019-10-10 RX ADMIN — TESTOSTERONE CYPIONATE 100 MG: 100 INJECTION, SOLUTION INTRAMUSCULAR at 09:06

## 2019-10-17 ENCOUNTER — CLINICAL SUPPORT (OUTPATIENT)
Dept: FAMILY MEDICINE CLINIC | Facility: CLINIC | Age: 79
End: 2019-10-17

## 2019-10-17 DIAGNOSIS — E34.9 TESTOSTERONE DEFICIENCY: ICD-10-CM

## 2019-10-17 PROCEDURE — 96372 THER/PROPH/DIAG INJ SC/IM: CPT | Performed by: NURSE PRACTITIONER

## 2019-10-17 RX ADMIN — TESTOSTERONE CYPIONATE 100 MG: 100 INJECTION, SOLUTION INTRAMUSCULAR at 09:20

## 2019-10-21 LAB
ALBUMIN SERPL-MCNC: 4.4 G/DL (ref 3.5–5)
ALBUMIN/GLOB SERPL: 1.5 G/DL (ref 1.1–1.8)
ALP SERPL-CCNC: 58 U/L (ref 38–126)
ALT SERPL W P-5'-P-CCNC: 33 U/L
ANION GAP SERPL CALCULATED.3IONS-SCNC: 9 MMOL/L (ref 5–15)
AST SERPL-CCNC: 36 U/L (ref 17–59)
BILIRUB SERPL-MCNC: 1.1 MG/DL (ref 0.2–1.3)
BUN BLD-MCNC: 29 MG/DL (ref 7–23)
BUN/CREAT SERPL: 23.2 (ref 7–25)
CALCIUM SPEC-SCNC: 9.6 MG/DL (ref 8.4–10.2)
CHLORIDE SERPL-SCNC: 101 MMOL/L (ref 101–112)
CO2 SERPL-SCNC: 32 MMOL/L (ref 22–30)
CREAT BLD-MCNC: 1.25 MG/DL (ref 0.7–1.3)
DEPRECATED RDW RBC AUTO: 46.9 FL (ref 37–54)
EOSINOPHIL # BLD MANUAL: 0.19 10*3/MM3 (ref 0–0.4)
EOSINOPHIL NFR BLD MANUAL: 3 % (ref 0.3–6.2)
ERYTHROCYTE [DISTWIDTH] IN BLOOD BY AUTOMATED COUNT: 13.1 % (ref 12.3–15.4)
ESTRADIOL SERPL HS-MCNC: 52.2 PG/ML
GFR SERPL CREATININE-BSD FRML MDRD: 56 ML/MIN/1.73 (ref 42–98)
GLOBULIN UR ELPH-MCNC: 3 GM/DL (ref 2.3–3.5)
GLUCOSE BLD-MCNC: 108 MG/DL (ref 70–99)
HCT VFR BLD AUTO: 54.3 % (ref 37.5–51)
HGB BLD-MCNC: 18.5 G/DL (ref 13–17.7)
LYMPHOCYTES # BLD MANUAL: 2.01 10*3/MM3 (ref 0.7–3.1)
LYMPHOCYTES NFR BLD MANUAL: 11 % (ref 5–12)
LYMPHOCYTES NFR BLD MANUAL: 32 % (ref 19.6–45.3)
MCH RBC QN AUTO: 33.3 PG (ref 26.6–33)
MCHC RBC AUTO-ENTMCNC: 34.1 G/DL (ref 31.5–35.7)
MCV RBC AUTO: 97.8 FL (ref 79–97)
MONOCYTES # BLD AUTO: 0.69 10*3/MM3 (ref 0.1–0.9)
NEUTROPHILS # BLD AUTO: 3.39 10*3/MM3 (ref 1.7–7)
NEUTROPHILS NFR BLD MANUAL: 54 % (ref 42.7–76)
PLATELET # BLD AUTO: 184 10*3/MM3 (ref 140–450)
PMV BLD AUTO: 9.7 FL (ref 6–12)
POTASSIUM BLD-SCNC: 4 MMOL/L (ref 3.4–5)
PROLACTIN SERPL-MCNC: 14.6 NG/ML (ref 4.04–15.2)
PROT SERPL-MCNC: 7.4 G/DL (ref 6.3–8.6)
RBC # BLD AUTO: 5.55 10*6/MM3 (ref 4.14–5.8)
RBC MORPH BLD: NORMAL
SMALL PLATELETS BLD QL SMEAR: ADEQUATE
SODIUM BLD-SCNC: 142 MMOL/L (ref 137–145)
T4 FREE SERPL-MCNC: 1.26 NG/DL (ref 0.93–1.7)
TESTOST SERPL-MCNC: 520 NG/DL (ref 193–740)
WBC MORPH BLD: NORMAL
WBC NRBC COR # BLD: 6.27 10*3/MM3 (ref 3.4–10.8)

## 2019-10-21 PROCEDURE — 82670 ASSAY OF TOTAL ESTRADIOL: CPT | Performed by: NURSE PRACTITIONER

## 2019-10-21 PROCEDURE — 85025 COMPLETE CBC W/AUTO DIFF WBC: CPT | Performed by: NURSE PRACTITIONER

## 2019-10-21 PROCEDURE — 80053 COMPREHEN METABOLIC PANEL: CPT | Performed by: NURSE PRACTITIONER

## 2019-10-21 PROCEDURE — 84403 ASSAY OF TOTAL TESTOSTERONE: CPT | Performed by: NURSE PRACTITIONER

## 2019-10-21 PROCEDURE — 36415 COLL VENOUS BLD VENIPUNCTURE: CPT | Performed by: NURSE PRACTITIONER

## 2019-10-21 PROCEDURE — 84146 ASSAY OF PROLACTIN: CPT | Performed by: NURSE PRACTITIONER

## 2019-10-21 PROCEDURE — 84439 ASSAY OF FREE THYROXINE: CPT | Performed by: NURSE PRACTITIONER

## 2019-10-25 ENCOUNTER — OFFICE VISIT (OUTPATIENT)
Dept: ENDOCRINOLOGY | Facility: CLINIC | Age: 79
End: 2019-10-25

## 2019-10-25 VITALS
BODY MASS INDEX: 28.84 KG/M2 | OXYGEN SATURATION: 99 % | DIASTOLIC BLOOD PRESSURE: 80 MMHG | HEART RATE: 67 BPM | SYSTOLIC BLOOD PRESSURE: 132 MMHG | WEIGHT: 206 LBS | HEIGHT: 71 IN

## 2019-10-25 DIAGNOSIS — E23.0 HYPOGONADOTROPIC HYPOGONADISM (HCC): ICD-10-CM

## 2019-10-25 DIAGNOSIS — E22.1 HYPERPROLACTINEMIA (HCC): ICD-10-CM

## 2019-10-25 DIAGNOSIS — E03.8 CENTRAL HYPOTHYROIDISM: Primary | ICD-10-CM

## 2019-10-25 DIAGNOSIS — E23.0 PANHYPOPITUITARISM (HCC): ICD-10-CM

## 2019-10-25 PROCEDURE — 99214 OFFICE O/P EST MOD 30 MIN: CPT | Performed by: NURSE PRACTITIONER

## 2019-10-25 RX ORDER — CABERGOLINE 0.5 MG/1
TABLET ORAL
Qty: 8 TABLET | Refills: 11 | Status: SHIPPED | OUTPATIENT
Start: 2019-10-25 | End: 2020-01-31 | Stop reason: SDUPTHER

## 2019-10-25 NOTE — PROGRESS NOTES
Subjective    Simeon Robin is a 79 y.o. male. he is here today for follow-up.    History of Present Illness       Primary Care Provider     Yina Daniels, APRN      79 year old male presents for         Hypopituitarism        1.5 cm pituitary adenoma         Previous   Context, 2.8 cm pituitary adenoma     , Hypophysectomy July 26, 2018 June 2019      MRI --- 1.5 cm pituitary microadenoma     Elevated prolactin          Alleviating - Dostinex      Timing, constant     Quality , controlled on  hormone replacement     Severity, moderate     Symptoms, fatigue     Central hypothyroidism     Currently on Levothyroxine 88 mcg daily     Hypogonadism     Currently taking 40 mg IM weekly                  Procedure: MRI head with and without contrast with pituitary  protocol on 6/6/2019     CLINICAL INDICATION: Elevated prolactin level, history of brain  tumor removed one year ago     TECHNIQUE: Multiplanar, multisequence MR images are obtained  throughout the head both prior to and following the  administration of IV gadolinium contrast. Thin section pre and  post contrast imaging through the sella is performed.   This examination was performed on a 1.5 Amber magnet.     COMPARISON: None     FINDINGS: There is a 1.3 x 1.0 x 1.5 cm sellar and suprasellar  mass consistent with a pituitary macroadenoma. This approaches  but does not definitely contact or impinge upon the optic chiasm  at this time. Sagittal midline view shows an otherwise normal  appearance of the midline structures. There is a normal  appearance of the craniovertebral junction. Flow related signal  within the major intracranial vessels is preserved. There is no  hydrocephalus. There is mild generalized cerebral atrophy. There  is minimal T2 signal abnormality in the periventricular white  matter consistent with minimal chronic small vessel ischemic  changes. Diffusion weighted imaging shows no evidence of acute  infarct. There is enhancement of  the macroadenoma that appears  relatively homogeneous. There is no other mass and no mass effect  or midline shift. There is no other pathologic contrast  enhancement.     IMPRESSION:  1. 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  2. Mild atrophy and minimal chronic small vessel ischemic  changes.     Electronically signed by:  Aram Young  6/7/2019 12:03 AM  CDT Workstation: [id number]   Imaging      MRI Pituitary With & Without Contrast (Order: [id number]) - 6/6/2019   Reprint Order Requisition      MRI Pituitary With & Without Contrast (Order #[id number]) on 6/6/19   Result Notes for MRI Pituitary With & Without Contrast      Notes recorded by Oseas Huizar APRN on 6/12/2019 at 9:32 AM CDT  Discussed results with patient;medication called into [facility]  ------    Notes recorded by Topher De Luna MD on 6/11/2019 at 12:21 PM CDT  Please tell him that from what we understand he was told to have a nonfunctional tumor but it is possible that it is making prolactin .   It is also possible that it is truly nonfunctional and the prolactin elevation is stalk effect but it is too high in my experience for this.     I think we should start cabergoline 0.5 mg tabs, half a tab on Monday and Thursday. Repeat labs with appt every 2 months.     Repeat MRI in 6 months. If it shrinks w prolactin normalization  it was a prolactinoma. 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     • hydrochlorothiazide (HYDRODIURIL) 25 MG tablet Take 50 mg by mouth Daily.     • hydrocortisone (CORTEF) 10 MG tablet Take 1 tablet by mouth Daily. Up to 1 tab po bid 60 tablet 11   • hydrocortisone (CORTEF) 5 MG tablet Take 1 tablet by mouth Daily. Up to 2 tabs po bid 120 tablet 5   • levothyroxine (SYNTHROID) 100 MCG tablet Take 1 tablet by mouth Daily. 30 tablet 11   • losartan (COZAAR) 100 MG tablet Take 100 mg by mouth Daily.     • rosuvastatin (CRESTOR) 5 MG tablet Take 5 mg by mouth Daily.     • testosterone cypionate (DEPO-TESTOSTERONE) 100 MG/ML solution injection Inject 1 mL into the appropriate muscle as directed by prescriber 1 (One) Time Per Week. 40mg weekly, Provide(#4)18G,21Gneedles,3mlsyringes 2 mL 5     Current Facility-Administered Medications   Medication Dose Route Frequency Provider Last Rate Last Dose   • testosterone cypionate (DEPO-TESTOSTERONE) injection solution 100 mg  100 mg Intramuscular Weekly Murdock, DEEPA Crespo   100 mg at 10/17/19 0920     Allergies   Allergen Reactions   • Donepezil Other (See Comments) and Unknown (See Comments)     Bad dreams  Bad dreams     • Naproxen Sodium Palpitations     Makes   Blood pressure     Social History     Socioeconomic History   • Marital status: Unknown     Spouse name: Not on file   • Number of children: Not on file   • Years of education: Not on file   • Highest education level: Not on file   Tobacco Use   • Smoking status: Never Smoker   • Smokeless tobacco: Never Used   Substance and Sexual Activity   • Alcohol use: No     Frequency: Never   • Drug use: No   • Sexual activity: No       Review of Systems  Review of Systems   Constitutional: Negative for activity change,  "appetite change, diaphoresis and fatigue.   HENT: Negative for facial swelling, sneezing, sore throat, tinnitus, trouble swallowing and voice change.    Eyes: Negative for photophobia, pain, discharge, redness, itching and visual disturbance.   Respiratory: Negative for apnea, cough, choking, chest tightness and shortness of breath.    Cardiovascular: Negative for chest pain, palpitations and leg swelling.   Gastrointestinal: Negative for abdominal distention, abdominal pain, constipation, diarrhea, nausea and vomiting.   Endocrine: Negative for cold intolerance, heat intolerance, polydipsia, polyphagia and polyuria.   Genitourinary: Negative for difficulty urinating, dysuria, frequency, hematuria and urgency.   Musculoskeletal: Negative for arthralgias, back pain, gait problem, joint swelling, myalgias, neck pain and neck stiffness.   Skin: Negative for color change, pallor, rash and wound.   Neurological: Negative for dizziness, tremors, weakness, light-headedness, numbness and headaches.   Hematological: Negative for adenopathy. Does not bruise/bleed easily.   Psychiatric/Behavioral: Negative for behavioral problems, confusion and sleep disturbance.        Objective    /80   Pulse 67   Ht 180.3 cm (71\")   Wt 93.4 kg (206 lb)   SpO2 99%   BMI 28.73 kg/m²   Physical Exam   Constitutional: He is oriented to person, place, and time. He appears well-developed and well-nourished. No distress.   HENT:   Head: Normocephalic and atraumatic.   Right Ear: External ear normal.   Left Ear: External ear normal.   Nose: Nose normal.   Eyes: Conjunctivae and EOM are normal. Pupils are equal, round, and reactive to light.   Neck: Normal range of motion. Neck supple. No tracheal deviation present. No thyromegaly present.   Cardiovascular: Normal rate, regular rhythm and normal heart sounds.   No murmur heard.  Pulmonary/Chest: Effort normal and breath sounds normal. No respiratory distress. He has no wheezes. "   Abdominal: Soft. Bowel sounds are normal. There is no tenderness. There is no rebound and no guarding.   Musculoskeletal: Normal range of motion. He exhibits no edema, tenderness or deformity.   Neurological: He is alert and oriented to person, place, and time. No cranial nerve deficit.   Skin: Skin is warm and dry. No rash noted.   Psychiatric: He has a normal mood and affect. His behavior is normal. Judgment and thought content normal.       Lab Review  Glucose (mg/dL)   Date Value   10/21/2019 108 (H)   07/23/2019 111 (H)   05/23/2019 106 (H)     Sodium   Date Value   10/21/2019 142 mmol/L   07/23/2019 144 mmol/L   05/23/2019 141 mmol/L   01/07/2019 141 mmol/L   07/27/2018 138 MMOL/L   05/25/2018 139 mmol/L   01/29/2018 136 mmol/L     Potassium   Date Value   10/21/2019 4.0 mmol/L   07/23/2019 4.3 mmol/L   05/23/2019 4.1 mmol/L   01/07/2019 3.8 mmol/L   07/27/2018 3.9 MMOL/L   05/25/2018 3.7 mmol/L   01/29/2018 4.2 mmol/L     Chloride   Date Value   10/21/2019 101 mmol/L   07/23/2019 104 mmol/L   05/23/2019 104 mmol/L   01/07/2019 104 mmol/L   07/27/2018 98 MMOL/L   05/25/2018 101 mmol/L   01/29/2018 100 mmol/L     CO2   Date Value   10/21/2019 32.0 mmol/L (H)   07/23/2019 32.0 mmol/L (H)   05/23/2019 28.0 mmol/L   07/27/2018 28 MMOL/L     BUN   Date Value   10/21/2019 29 mg/dL (H)   07/23/2019 24 mg/dL (H)   05/23/2019 19 mg/dL   01/07/2019 20 mg/dL (H)   07/27/2018 20 MG/DL   05/25/2018 23 mg/dL (H)     Creatinine   Date Value   10/21/2019 1.25 mg/dL   07/23/2019 1.16 mg/dL   05/23/2019 1.03 mg/dL   01/07/2019 1.1 mg/dL   07/27/2018 1.0 MG/DL   05/25/2018 1.1 mg/dL   01/29/2018 1.1 mg/dL     Triglycerides (mg/dL)   Date Value   01/07/2019 314 (H)   05/25/2018 466 (H)   01/29/2018 491 (H)     LDL Cholesterol  (mg/dL)   Date Value   01/07/2019 103 (H)   05/25/2018 94   01/29/2018 92       Assessment/Plan      1. Central hypothyroidism    2. Panhypopituitarism (CMS/HCC)    3. Hyperprolactinemia (CMS/HCC)    4.  Hypogonadotropic hypogonadism (CMS/HCC)    .    Medications prescribed:  Outpatient Encounter Medications as of 10/25/2019   Medication Sig Dispense Refill   • cabergoline (DOSTINEX) 0.5 MG tablet Take 1/2 tablet onThursday 8 tablet 11   • ferrous gluconate 324 (37.5 Fe) MG tablet tablet Take 324 mg by mouth Daily With Breakfast.     • hydrochlorothiazide (HYDRODIURIL) 25 MG tablet Take 50 mg by mouth Daily.     • hydrocortisone (CORTEF) 10 MG tablet Take 1 tablet by mouth Daily. Up to 1 tab po bid 60 tablet 11   • hydrocortisone (CORTEF) 5 MG tablet Take 1 tablet by mouth Daily. Up to 2 tabs po bid 120 tablet 5   • levothyroxine (SYNTHROID) 100 MCG tablet Take 1 tablet by mouth Daily. 30 tablet 11   • losartan (COZAAR) 100 MG tablet Take 100 mg by mouth Daily.     • rosuvastatin (CRESTOR) 5 MG tablet Take 5 mg by mouth Daily.     • testosterone cypionate (DEPO-TESTOSTERONE) 100 MG/ML solution injection Inject 1 mL into the appropriate muscle as directed by prescriber 1 (One) Time Per Week. 40mg weekly, Provide(#4)18G,21Gneedles,3mlsyringes 2 mL 5   • [DISCONTINUED] cabergoline (DOSTINEX) 0.5 MG tablet Take 1/2 tablet on Monday and Thursday 8 tablet 11     Facility-Administered Encounter Medications as of 10/25/2019   Medication Dose Route Frequency Provider Last Rate Last Dose   • testosterone cypionate (DEPO-TESTOSTERONE) injection solution 100 mg  100 mg Intramuscular Weekly Murdock, DEEPA Crespo   100 mg at 10/17/19 0920       Orders placed during this encounter include:  Orders Placed This Encounter   Procedures   • Comprehensive Metabolic Panel   • T4, Free   • Testosterone   • Prolactin   • CBC & Differential     Order Specific Question:   Manual Differential     Answer:   No     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           Central hypothyroidism                               --              Lab Results   Component Value Date     TSH 0.977 07/23/2019         On levothyroxine 88 mcg daily        Component      Latest Ref Rng & Units 10/21/2019   Free T4      0.93 - 1.70 ng/dL 1.26        keep 88 mcg daily      -----------------------------              Hypogonadotropic Hypogonadism     Testosterone cypionate           restart at 40 mg IM       Component      Latest Ref Rng & Units 10/21/2019   Testosterone, Total      193.00 - 740.00 ng/dL 520.00       Component      Latest Ref Rng & Units 10/21/2019   WBC      3.40 - 10.80 10*3/mm3 6.27   RBC      4.14 - 5.80 10*6/mm3 5.55   Hemoglobin      13.0 - 17.7 g/dL 18.5 (H)   Hematocrit      37.5 - 51.0 % 54.3 (H)   MCV      79.0 - 97.0 fL 97.8 (H)   MCH      26.6 - 33.0 pg 33.3 (H)   MCHC      31.5 - 35.7 g/dL 34.1   RDW      12.3 - 15.4 % 13.1   RDW-SD      37.0 - 54.0 fl 46.9   MPV      6.0 - 12.0 fL 9.7   Platelets      140 - 450 10*3/mm3 184      stop testosterone due to the elevated h and h         -------------------------------------------------------------------     Was previously on hydrocortisone for adrenal insufficiency that I stopped and demonstrated he has intact adrenal axis     --     Normal IGF1     Elevated prolactin     pituitary tumor        Component      Latest Ref Rng & Units 5/25/2018 1/7/2019 5/23/2019 7/23/2019   Prolactin      4.04 - 15.20 ng/mL 28.73 (H) 21.12 (H) 68.00 (H) 0.33 (L)         dostinex 0.5 mg 1/2 tablet po twice a week -- decrease to once a week      Component      Latest Ref Rng & Units 10/21/2019   Prolactin      4.04 - 15.20 ng/mL 14.60       Keep dostinex 0.5 mg 1/2 tablet weekly     Has repeat MRI in Dec. 2019        He meet with neurosurgeon and states the tumor was never completely removed just shaved                   4. Follow-up: Return in about 3 months (around 1/25/2020) for Recheck.                 MRI from Nov. 2019     1.36 pituitary macroadenoma previously measuring 1.5 cm in June 2019

## 2019-11-18 RX ORDER — LEVOTHYROXINE SODIUM 0.1 MG/1
TABLET ORAL
Qty: 30 TABLET | Refills: 6 | Status: SHIPPED | OUTPATIENT
Start: 2019-11-18 | End: 2021-06-10 | Stop reason: SDUPTHER

## 2020-01-27 LAB
ALBUMIN SERPL-MCNC: 4.2 G/DL (ref 3.5–5)
ALBUMIN/GLOB SERPL: 1.3 G/DL (ref 1.1–1.8)
ALP SERPL-CCNC: 65 U/L (ref 38–126)
ALT SERPL W P-5'-P-CCNC: 42 U/L
ANION GAP SERPL CALCULATED.3IONS-SCNC: 5 MMOL/L (ref 5–15)
ANISOCYTOSIS BLD QL: NORMAL
AST SERPL-CCNC: 37 U/L (ref 17–59)
BILIRUB SERPL-MCNC: 1 MG/DL (ref 0.2–1.3)
BUN BLD-MCNC: 21 MG/DL (ref 7–23)
BUN/CREAT SERPL: 20.2 (ref 7–25)
CALCIUM SPEC-SCNC: 9.9 MG/DL (ref 8.4–10.2)
CHLORIDE SERPL-SCNC: 103 MMOL/L (ref 101–112)
CO2 SERPL-SCNC: 34 MMOL/L (ref 22–30)
CREAT BLD-MCNC: 1.04 MG/DL (ref 0.7–1.3)
DEPRECATED RDW RBC AUTO: 50.3 FL (ref 37–54)
EOSINOPHIL # BLD MANUAL: 0.16 10*3/MM3 (ref 0–0.4)
EOSINOPHIL NFR BLD MANUAL: 3 % (ref 0.3–6.2)
ERYTHROCYTE [DISTWIDTH] IN BLOOD BY AUTOMATED COUNT: 14.6 % (ref 12.3–15.4)
GFR SERPL CREATININE-BSD FRML MDRD: 69 ML/MIN/1.73 (ref 42–98)
GLOBULIN UR ELPH-MCNC: 3.2 GM/DL (ref 2.3–3.5)
GLUCOSE BLD-MCNC: 110 MG/DL (ref 70–99)
HCT VFR BLD AUTO: 46.5 % (ref 37.5–51)
HGB BLD-MCNC: 15.7 G/DL (ref 13–17.7)
LYMPHOCYTES # BLD MANUAL: 1.53 10*3/MM3 (ref 0.7–3.1)
LYMPHOCYTES NFR BLD MANUAL: 12 % (ref 5–12)
LYMPHOCYTES NFR BLD MANUAL: 29 % (ref 19.6–45.3)
MCH RBC QN AUTO: 32.8 PG (ref 26.6–33)
MCHC RBC AUTO-ENTMCNC: 33.8 G/DL (ref 31.5–35.7)
MCV RBC AUTO: 97.1 FL (ref 79–97)
MONOCYTES # BLD AUTO: 0.63 10*3/MM3 (ref 0.1–0.9)
NEUTROPHILS # BLD AUTO: 2.95 10*3/MM3 (ref 1.7–7)
NEUTROPHILS NFR BLD MANUAL: 56 % (ref 42.7–76)
PLATELET # BLD AUTO: 163 10*3/MM3 (ref 140–450)
PMV BLD AUTO: 9.2 FL (ref 6–12)
POTASSIUM BLD-SCNC: 4.3 MMOL/L (ref 3.4–5)
PROT SERPL-MCNC: 7.4 G/DL (ref 6.3–8.6)
RBC # BLD AUTO: 4.79 10*6/MM3 (ref 4.14–5.8)
SMALL PLATELETS BLD QL SMEAR: ADEQUATE
SODIUM BLD-SCNC: 142 MMOL/L (ref 137–145)
WBC MORPH BLD: NORMAL
WBC NRBC COR # BLD: 5.27 10*3/MM3 (ref 3.4–10.8)

## 2020-01-27 PROCEDURE — 84146 ASSAY OF PROLACTIN: CPT | Performed by: NURSE PRACTITIONER

## 2020-01-27 PROCEDURE — 36415 COLL VENOUS BLD VENIPUNCTURE: CPT | Performed by: NURSE PRACTITIONER

## 2020-01-27 PROCEDURE — 84439 ASSAY OF FREE THYROXINE: CPT | Performed by: NURSE PRACTITIONER

## 2020-01-27 PROCEDURE — 80053 COMPREHEN METABOLIC PANEL: CPT | Performed by: NURSE PRACTITIONER

## 2020-01-27 PROCEDURE — 85025 COMPLETE CBC W/AUTO DIFF WBC: CPT | Performed by: NURSE PRACTITIONER

## 2020-01-27 PROCEDURE — 84403 ASSAY OF TOTAL TESTOSTERONE: CPT | Performed by: NURSE PRACTITIONER

## 2020-01-28 LAB
PROLACTIN SERPL-MCNC: 0.6 NG/ML (ref 4.04–15.2)
T4 FREE SERPL-MCNC: 1.22 NG/DL (ref 0.93–1.7)
TESTOST SERPL-MCNC: 94.6 NG/DL (ref 193–740)

## 2020-01-31 ENCOUNTER — OFFICE VISIT (OUTPATIENT)
Dept: ENDOCRINOLOGY | Facility: CLINIC | Age: 80
End: 2020-01-31

## 2020-01-31 VITALS
WEIGHT: 204 LBS | OXYGEN SATURATION: 98 % | SYSTOLIC BLOOD PRESSURE: 130 MMHG | HEART RATE: 63 BPM | DIASTOLIC BLOOD PRESSURE: 76 MMHG | HEIGHT: 71 IN | BODY MASS INDEX: 28.56 KG/M2

## 2020-01-31 DIAGNOSIS — E03.8 CENTRAL HYPOTHYROIDISM: Chronic | ICD-10-CM

## 2020-01-31 DIAGNOSIS — E23.0 PANHYPOPITUITARISM (HCC): ICD-10-CM

## 2020-01-31 DIAGNOSIS — E22.1 HYPERPROLACTINEMIA (HCC): Primary | ICD-10-CM

## 2020-01-31 DIAGNOSIS — E23.0 HYPOGONADOTROPIC HYPOGONADISM (HCC): Chronic | ICD-10-CM

## 2020-01-31 DIAGNOSIS — E55.9 VITAMIN D DEFICIENCY, UNSPECIFIED: ICD-10-CM

## 2020-01-31 PROCEDURE — 99214 OFFICE O/P EST MOD 30 MIN: CPT | Performed by: NURSE PRACTITIONER

## 2020-01-31 RX ORDER — CABERGOLINE 0.5 MG/1
TABLET ORAL
Qty: 8 TABLET | Refills: 11 | Status: SHIPPED | OUTPATIENT
Start: 2020-01-31 | End: 2020-04-27 | Stop reason: SDUPTHER

## 2020-01-31 NOTE — PROGRESS NOTES
Subjective    Simeon Robin is a 79 y.o. male. he for follow up     History of Present Illness       Primary Care Provider     Yina Daniels, DEEPA        79 year old male presents for         Hypopituitarism        1.5 cm pituitary adenoma         Previous   Context, 2.8 cm pituitary adenoma     , Hypophysectomy July 26, 2018 June 2019        MRI --- 1.5 cm pituitary microadenoma     Nov. 2019     1.3 cm        Elevated prolactin            Alleviating - Dostinex      Timing, constant     Quality , controlled on  hormone replacement     Severity, moderate     Symptoms, fatigue     Central hypothyroidism     Currently on Levothyroxine 100 mcg daily     Hypogonadism     Stopped treatment due to side effects                  Procedure: MRI head with and without contrast with pituitary  protocol on 6/6/2019     CLINICAL INDICATION: Elevated prolactin level, history of brain  tumor removed one year ago     TECHNIQUE: Multiplanar, multisequence MR images are obtained  throughout the head both prior to and following the  administration of IV gadolinium contrast. Thin section pre and  post contrast imaging through the sella is performed.   This examination was performed on a 1.5 Amber magnet.     COMPARISON: None     FINDINGS: There is a 1.3 x 1.0 x 1.5 cm sellar and suprasellar  mass consistent with a pituitary macroadenoma. This approaches  but does not definitely contact or impinge upon the optic chiasm  at this time. Sagittal midline view shows an otherwise normal  appearance of the midline structures. There is a normal  appearance of the craniovertebral junction. Flow related signal  within the major intracranial vessels is preserved. There is no  hydrocephalus. There is mild generalized cerebral atrophy. There  is minimal T2 signal abnormality in the periventricular white  matter consistent with minimal chronic small vessel ischemic  changes. Diffusion weighted imaging shows no evidence of  acute  infarct. There is enhancement of the macroadenoma that appears  relatively homogeneous. There is no other mass and no mass effect  or midline shift. There is no other pathologic contrast  enhancement.     IMPRESSION:  1. 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  2. Mild atrophy and minimal chronic small vessel ischemic  changes.     Electronically signed by:  Aram Young  6/7/2019 12:03 AM  CDT Workstation: [id number]   Imaging      MRI Pituitary With & Without Contrast (Order: [id number]) - 6/6/2019   Reprint Order Requisition      MRI Pituitary With & Without Contrast (Order #[id number]) on 6/6/19   Result Notes for MRI Pituitary With & Without Contrast      Notes recorded by Oseas Huizar APRN on 6/12/2019 at 9:32 AM CDT  Discussed results with patient;medication called into [facility]  ------    Notes recorded by Topher De Luna MD on 6/11/2019 at 12:21 PM CDT  Please tell him that from what we understand he was told to have a nonfunctional tumor but it is possible that it is making prolactin .   It is also possible that it is truly nonfunctional and the prolactin elevation is stalk effect but it is too high in my experience for this.     I think we should start cabergoline 0.5 mg tabs, half a tab on Monday and Thursday. Repeat labs with appt every 2 months.     Repeat MRI in 6 months. If it shrinks w prolactin normalization  it was a prolactinoma. 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     • hydrochlorothiazide (HYDRODIURIL) 25 MG tablet Take 50 mg by mouth Daily.     • levothyroxine (SYNTHROID, LEVOTHROID) 100 MCG tablet TAKE 1 TABLET BY MOUTH EVERY DAY 30 tablet 6   • losartan (COZAAR) 100 MG tablet Take 100 mg by mouth Daily.     • rosuvastatin (CRESTOR) 5 MG tablet Take 5 mg by mouth Daily.     • testosterone cypionate (DEPO-TESTOSTERONE) 100 MG/ML solution injection Inject 1 mL into the appropriate muscle as directed by prescriber 1 (One) Time Per Week. 40mg weekly, Provide(#4)18G,21Gneedles,3mlsyringes 2 mL 5     Current Facility-Administered Medications   Medication Dose Route Frequency Provider Last Rate Last Dose   • testosterone cypionate (DEPO-TESTOSTERONE) injection solution 100 mg  100 mg Intramuscular Weekly Murdock, DEEPA Crespo   100 mg at 11/07/19 1713     Allergies   Allergen Reactions   • Donepezil Other (See Comments) and Unknown (See Comments)     Bad dreams  Bad dreams     • Naproxen Sodium Palpitations     Makes   Blood pressure     Social History     Socioeconomic History   • Marital status: Unknown     Spouse name: Not on file   • Number of children: Not on file   • Years of education: Not on file   • Highest education level: Not on file   Tobacco Use   • Smoking status: Never Smoker   • Smokeless tobacco: Never Used   Substance and Sexual Activity   • Alcohol use: No     Frequency: Never   • Drug use: No   • Sexual activity: Never       Review of Systems  Review of Systems   Constitutional: Negative for activity change, appetite change, diaphoresis and fatigue.   HENT: Negative for facial swelling, sneezing, sore throat, tinnitus, trouble swallowing and voice change.   "  Eyes: Negative for photophobia, pain, discharge, redness, itching and visual disturbance.   Respiratory: Negative for apnea, cough, choking, chest tightness and shortness of breath.    Cardiovascular: Negative for chest pain, palpitations and leg swelling.   Gastrointestinal: Negative for abdominal distention, abdominal pain, constipation, diarrhea, nausea and vomiting.   Endocrine: Negative for cold intolerance, heat intolerance, polydipsia, polyphagia and polyuria.   Genitourinary: Negative for difficulty urinating, dysuria, frequency, hematuria and urgency.   Musculoskeletal: Negative for arthralgias, back pain, gait problem, joint swelling, myalgias, neck pain and neck stiffness.   Skin: Negative for color change, pallor, rash and wound.   Neurological: Negative for dizziness, tremors, weakness, light-headedness, numbness and headaches.   Hematological: Negative for adenopathy. Does not bruise/bleed easily.   Psychiatric/Behavioral: Negative for behavioral problems, confusion and sleep disturbance.        Objective    /76   Pulse 63   Ht 180.3 cm (71\")   Wt 92.5 kg (204 lb)   SpO2 98%   BMI 28.45 kg/m²   Physical Exam   Constitutional: He is oriented to person, place, and time. He appears well-developed and well-nourished. No distress.   HENT:   Head: Normocephalic and atraumatic.   Right Ear: External ear normal.   Left Ear: External ear normal.   Nose: Nose normal.   Eyes: Pupils are equal, round, and reactive to light. Conjunctivae and EOM are normal.   Neck: Normal range of motion. Neck supple. No tracheal deviation present. No thyromegaly present.   Cardiovascular: Normal rate, regular rhythm and normal heart sounds.   No murmur heard.  Pulmonary/Chest: Effort normal and breath sounds normal. No respiratory distress. He has no wheezes.   Abdominal: Soft. Bowel sounds are normal. There is no tenderness. There is no rebound and no guarding.   Musculoskeletal: Normal range of motion. He exhibits " no edema, tenderness or deformity.   Neurological: He is alert and oriented to person, place, and time. No cranial nerve deficit.   Skin: Skin is warm and dry. No rash noted.   Psychiatric: He has a normal mood and affect. His behavior is normal. Judgment and thought content normal.       Lab Review  Glucose (mg/dL)   Date Value   01/27/2020 110 (H)   10/21/2019 108 (H)   07/23/2019 111 (H)     Sodium   Date Value   01/27/2020 142 mmol/L   10/21/2019 142 mmol/L   07/23/2019 144 mmol/L   01/07/2019 141 mmol/L   07/27/2018 138 MMOL/L   05/25/2018 139 mmol/L   01/29/2018 136 mmol/L     Potassium   Date Value   01/27/2020 4.3 mmol/L   10/21/2019 4.0 mmol/L   07/23/2019 4.3 mmol/L   01/07/2019 3.8 mmol/L   07/27/2018 3.9 MMOL/L   05/25/2018 3.7 mmol/L   01/29/2018 4.2 mmol/L     Chloride   Date Value   01/27/2020 103 mmol/L   10/21/2019 101 mmol/L   07/23/2019 104 mmol/L   01/07/2019 104 mmol/L   07/27/2018 98 MMOL/L   05/25/2018 101 mmol/L   01/29/2018 100 mmol/L     CO2   Date Value   01/27/2020 34.0 mmol/L (H)   10/21/2019 32.0 mmol/L (H)   07/23/2019 32.0 mmol/L (H)   07/27/2018 28 MMOL/L     BUN   Date Value   01/27/2020 21 mg/dL   10/21/2019 29 mg/dL (H)   07/23/2019 24 mg/dL (H)   01/07/2019 20 mg/dL (H)   07/27/2018 20 MG/DL   05/25/2018 23 mg/dL (H)     Creatinine   Date Value   01/27/2020 1.04 mg/dL   10/21/2019 1.25 mg/dL   07/23/2019 1.16 mg/dL   01/07/2019 1.1 mg/dL   07/27/2018 1.0 MG/DL   05/25/2018 1.1 mg/dL   01/29/2018 1.1 mg/dL     Triglycerides (mg/dL)   Date Value   01/07/2019 314 (H)   05/25/2018 466 (H)     LDL Cholesterol  (mg/dL)   Date Value   01/07/2019 103 (H)   05/25/2018 94       Assessment/Plan      1. Hyperprolactinemia (CMS/HCC)    2. Central hypothyroidism    3. Hypogonadotropic hypogonadism (CMS/HCC)    4. Panhypopituitarism (CMS/[facility])    .    Medications prescribed:  Outpatient Encounter Medications as of 1/31/2020   Medication Sig Dispense Refill   • cabergoline (DOSTINEX) 0.5 MG  tablet Take 1/2 tablet onThursday 8 tablet 11   • ferrous gluconate 324 (37.5 Fe) MG tablet tablet Take 324 mg by mouth Daily With Breakfast.     • hydrochlorothiazide (HYDRODIURIL) 25 MG tablet Take 50 mg by mouth Daily.     • levothyroxine (SYNTHROID, LEVOTHROID) 100 MCG tablet TAKE 1 TABLET BY MOUTH EVERY DAY 30 tablet 6   • losartan (COZAAR) 100 MG tablet Take 100 mg by mouth Daily.     • rosuvastatin (CRESTOR) 5 MG tablet Take 5 mg by mouth Daily.     • testosterone cypionate (DEPO-TESTOSTERONE) 100 MG/ML solution injection Inject 1 mL into the appropriate muscle as directed by prescriber 1 (One) Time Per Week. 40mg weekly, Provide(#4)18G,21Gneedles,3mlsyringes 2 mL 5   • [DISCONTINUED] cabergoline (DOSTINEX) 0.5 MG tablet Take 1/2 tablet onThursday 8 tablet 11     Facility-Administered Encounter Medications as of 1/31/2020   Medication Dose Route Frequency Provider Last Rate Last Dose   • testosterone cypionate (DEPO-TESTOSTERONE) injection solution 100 mg  100 mg Intramuscular Weekly Murdock, Kristina MilkaDEEPA   100 mg at 11/07/19 1713       Orders placed during this encounter include:  Orders Placed This Encounter   Procedures   • Prolactin     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           Central hypothyroidism                                 Levothyroxine 100 mcg daily      Component      Latest Ref Rng & Units 1/27/2020   Free T4      0.93 - 1.70 ng/dL 1.22        -----------------------------              Hypogonadotropic Hypogonadism     Testosterone cypionate         Off treatment           Component      Latest Ref Rng & Units 1/27/2020   Testosterone, Total      193.00 - 740.00 ng/dL 94.60 (L)                -------------------------------------------------------------------     Was previously on hydrocortisone for adrenal insufficiency that I stopped and demonstrated he has intact adrenal axis     --     Normal IGF1     Elevated prolactin     pituitary tumor        Component      Latest Ref Rng &  Units 5/25/2018 1/7/2019 5/23/2019 7/23/2019   Prolactin      4.04 - 15.20 ng/mL 28.73 (H) 21.12 (H) 68.00 (H) 0.33 (L)         dostinex 0.5 mg 1/2 tablet po twice a week -- decrease to once a week        Component      Latest Ref Rng & Units 10/21/2019   Prolactin      4.04 - 15.20 ng/mL 14.60         Keep dostinex 0.5 mg 1/2 tablet weekly      Component      Latest Ref Rng & Units 1/27/2020   Prolactin      4.04 - 15.20 ng/mL 0.60 (L)         Keep 1/2 weekly      Has repeat MRI in Dec. 2019         He meet with neurosurgeon and states the tumor was never completely removed just shaved                                 MRI from Nov. 2019      1.36 pituitary macroadenoma previously measuring 1.5 cm in June 2019         4. Follow-up: Return in about 3 months (around 4/30/2020) for Recheck.

## 2020-04-27 ENCOUNTER — OFFICE VISIT (OUTPATIENT)
Dept: ENDOCRINOLOGY | Facility: CLINIC | Age: 80
End: 2020-04-27

## 2020-04-27 DIAGNOSIS — I10 ESSENTIAL HYPERTENSION: Primary | ICD-10-CM

## 2020-04-27 DIAGNOSIS — E22.1 HYPERPROLACTINEMIA (HCC): ICD-10-CM

## 2020-04-27 DIAGNOSIS — E23.0 HYPOGONADOTROPIC HYPOGONADISM (HCC): ICD-10-CM

## 2020-04-27 DIAGNOSIS — E55.9 VITAMIN D DEFICIENCY, UNSPECIFIED: ICD-10-CM

## 2020-04-27 DIAGNOSIS — E03.8 CENTRAL HYPOTHYROIDISM: ICD-10-CM

## 2020-04-27 PROCEDURE — 99442 PR PHYS/QHP TELEPHONE EVALUATION 11-20 MIN: CPT | Performed by: NURSE PRACTITIONER

## 2020-04-27 RX ORDER — CABERGOLINE 0.5 MG/1
TABLET ORAL
Qty: 8 TABLET | Refills: 11 | Status: SHIPPED | OUTPATIENT
Start: 2020-04-27 | End: 2021-11-10 | Stop reason: SDUPTHER

## 2020-04-27 NOTE — PROGRESS NOTES
Subjective    Simeon Robin is a 79 y.o. male. he is here for follow up     History of Present Illness       You have chosen to receive care through a telephone visit. Do you consent to use a telephone visit for your medical care today? Yes      This is a telephone visit due to pandemic and patient is following the CDC guidelines for social distancing           Primary Care Provider     Yina Daniels, DEEPA        79 year old male presents for         Hypopituitarism        1.5 cm pituitary adenoma         Previous   Context, 2.8 cm pituitary adenoma     , Hypophysectomy July 26, 2018 June 2019        MRI --- 1.5 cm pituitary microadenoma      Nov. 2019      1.3 cm         Elevated prolactin            Alleviating - Dostinex      Timing, constant     Quality , controlled on  hormone replacement     Severity, moderate     Symptoms -- fatigue, sweating at times, eating more      Central hypothyroidism     Currently on Levothyroxine 100 mcg daily       Hypogonadism     Stopped treatment due to side effects                  Procedure: MRI head with and without contrast with pituitary  protocol on 6/6/2019     CLINICAL INDICATION: Elevated prolactin level, history of brain  tumor removed one year ago     TECHNIQUE: Multiplanar, multisequence MR images are obtained  throughout the head both prior to and following the  administration of IV gadolinium contrast. Thin section pre and  post contrast imaging through the sella is performed.   This examination was performed on a 1.5 Amber magnet.     COMPARISON: None     FINDINGS: There is a 1.3 x 1.0 x 1.5 cm sellar and suprasellar  mass consistent with a pituitary macroadenoma. This approaches  but does not definitely contact or impinge upon the optic chiasm  at this time. Sagittal midline view shows an otherwise normal  appearance of the midline structures. There is a normal  appearance of the craniovertebral junction. Flow related signal  within the major  intracranial vessels is preserved. There is no  hydrocephalus. There is mild generalized cerebral atrophy. There  is minimal T2 signal abnormality in the periventricular white  matter consistent with minimal chronic small vessel ischemic  changes. Diffusion weighted imaging shows no evidence of acute  infarct. There is enhancement of the macroadenoma that appears  relatively homogeneous. There is no other mass and no mass effect  or midline shift. There is no other pathologic contrast  enhancement.     IMPRESSION:  1. 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  2. Mild atrophy and minimal chronic small vessel ischemic  changes.     Electronically signed by:  Aram Young  6/7/2019 12:03 AM  CDT Workstation: [id number]   Imaging      MRI Pituitary With & Without Contrast (Order: [id number]) - 6/6/2019   Reprint Order Requisition      MRI Pituitary With & Without Contrast (Order #[id number]) on 6/6/19   Result Notes for MRI Pituitary With & Without Contrast      Notes recorded by Oseas Huizar APRN on 6/12/2019 at 9:32 AM CDT  Discussed results with patient;medication called into [facility]  ------    Notes recorded by Topher De Luna MD on 6/11/2019 at 12:21 PM CDT  Please tell him that from what we understand he was told to have a nonfunctional tumor but it is possible that it is making prolactin .   It is also possible that it is truly nonfunctional and the prolactin elevation is stalk effect but it is too high in my experience for this.     I think we should start cabergoline 0.5 mg tabs, half a tab on Monday and Thursday. Repeat labs with appt every 2 months.     Repeat MRI in 6 months. If it shrinks w prolactin normalization  it was a prolactinoma. 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     • hydrochlorothiazide (HYDRODIURIL) 25 MG tablet Take 50 mg by mouth Daily.     • levothyroxine (SYNTHROID, LEVOTHROID) 100 MCG tablet TAKE 1 TABLET BY MOUTH EVERY DAY 30 tablet 6   • losartan (COZAAR) 100 MG tablet Take 100 mg by mouth Daily.     • rosuvastatin (CRESTOR) 5 MG tablet Take 5 mg by mouth Daily.     • testosterone cypionate (DEPO-TESTOSTERONE) 100 MG/ML solution injection Inject 1 mL into the appropriate muscle as directed by prescriber 1 (One) Time Per Week. 40mg weekly, Provide(#4)18G,21Gneedles,3mlsyringes 2 mL 5     Current Facility-Administered Medications   Medication Dose Route Frequency Provider Last Rate Last Dose   • testosterone cypionate (DEPO-TESTOSTERONE) injection solution 100 mg  100 mg Intramuscular Weekly Murdock, DEEPA Crespo   100 mg at 11/07/19 1713     Allergies   Allergen Reactions   • Donepezil Other (See Comments) and Unknown (See Comments)     Bad dreams  Bad dreams     • Naproxen Sodium Palpitations     Makes   Blood pressure     Social History     Socioeconomic History   • Marital status: Unknown     Spouse name: Not on file   • Number of children: Not on file   • Years of education: Not on file   • Highest education level: Not on file   Tobacco Use   • Smoking status: Never Smoker   • Smokeless tobacco: Never Used   Substance and Sexual Activity   • Alcohol use: No     Frequency: Never   • Drug  use: No   • Sexual activity: Never       Review of Systems  Review of Systems   Constitutional: Negative for activity change, appetite change, diaphoresis and fatigue.   HENT: Negative for facial swelling, sneezing, sore throat, tinnitus, trouble swallowing and voice change.    Eyes: Negative for photophobia, pain, discharge, redness, itching and visual disturbance.   Respiratory: Negative for apnea, cough, choking, chest tightness and shortness of breath.    Cardiovascular: Negative for chest pain, palpitations and leg swelling.   Gastrointestinal: Negative for abdominal distention, abdominal pain, constipation, diarrhea, nausea and vomiting.   Endocrine: Negative for cold intolerance, heat intolerance, polydipsia, polyphagia and polyuria.   Genitourinary: Negative for difficulty urinating, dysuria, frequency, hematuria and urgency.   Musculoskeletal: Negative for arthralgias, back pain, gait problem, joint swelling, myalgias, neck pain and neck stiffness.   Skin: Negative for color change, pallor, rash and wound.   Neurological: Negative for dizziness, tremors, weakness, light-headedness, numbness and headaches.   Hematological: Negative for adenopathy. Does not bruise/bleed easily.   Psychiatric/Behavioral: Negative for behavioral problems, confusion and sleep disturbance.        Objective    There were no vitals taken for this visit.  Physical Exam   Constitutional: He is oriented to person, place, and time.   Neurological: He is alert and oriented to person, place, and time.   Skin: No pallor.   Psychiatric: He has a normal mood and affect. His behavior is normal. Judgment and thought content normal.       BP -- 131/69  HR - 68   Temp - 97.5   Weight 193  Lbs         Lab Review  Glucose (mg/dL)   Date Value   01/27/2020 110 (H)   10/21/2019 108 (H)   07/23/2019 111 (H)     Sodium   Date Value   01/27/2020 142 mmol/L   10/21/2019 142 mmol/L   07/23/2019 144 mmol/L   01/07/2019 141 mmol/L   07/27/2018 138 MMOL/L    05/25/2018 139 mmol/L   01/29/2018 136 mmol/L     Potassium   Date Value   01/27/2020 4.3 mmol/L   10/21/2019 4.0 mmol/L   07/23/2019 4.3 mmol/L   01/07/2019 3.8 mmol/L   07/27/2018 3.9 MMOL/L   05/25/2018 3.7 mmol/L   01/29/2018 4.2 mmol/L     Chloride   Date Value   01/27/2020 103 mmol/L   10/21/2019 101 mmol/L   07/23/2019 104 mmol/L   01/07/2019 104 mmol/L   07/27/2018 98 MMOL/L   05/25/2018 101 mmol/L   01/29/2018 100 mmol/L     CO2   Date Value   01/27/2020 34.0 mmol/L (H)   10/21/2019 32.0 mmol/L (H)   07/23/2019 32.0 mmol/L (H)   07/27/2018 28 MMOL/L     BUN   Date Value   01/27/2020 21 mg/dL   10/21/2019 29 mg/dL (H)   07/23/2019 24 mg/dL (H)   01/07/2019 20 mg/dL (H)   07/27/2018 20 MG/DL   05/25/2018 23 mg/dL (H)     Creatinine   Date Value   01/27/2020 1.04 mg/dL   10/21/2019 1.25 mg/dL   07/23/2019 1.16 mg/dL   01/07/2019 1.1 mg/dL   07/27/2018 1.0 MG/DL   05/25/2018 1.1 mg/dL   01/29/2018 1.1 mg/dL     Triglycerides (mg/dL)   Date Value   01/07/2019 314 (H)   05/25/2018 466 (H)     LDL Cholesterol  (mg/dL)   Date Value   01/07/2019 103 (H)   05/25/2018 94       Assessment/Plan      1. Essential hypertension    2. Central hypothyroidism    3. Hyperprolactinemia (CMS/HCC)    4. Hypogonadotropic hypogonadism (CMS/HCC)    5. Vitamin D deficiency, unspecified     .    Medications prescribed:  Outpatient Encounter Medications as of 4/27/2020   Medication Sig Dispense Refill   • cabergoline (DOSTINEX) 0.5 MG tablet Take 1/2 tablet onThursday 8 tablet 11   • ferrous gluconate 324 (37.5 Fe) MG tablet tablet Take 324 mg by mouth Daily With Breakfast.     • hydrochlorothiazide (HYDRODIURIL) 25 MG tablet Take 50 mg by mouth Daily.     • levothyroxine (SYNTHROID, LEVOTHROID) 100 MCG tablet TAKE 1 TABLET BY MOUTH EVERY DAY 30 tablet 6   • losartan (COZAAR) 100 MG tablet Take 100 mg by mouth Daily.     • rosuvastatin (CRESTOR) 5 MG tablet Take 5 mg by mouth Daily.     • testosterone cypionate (DEPO-TESTOSTERONE)  100 MG/ML solution injection Inject 1 mL into the appropriate muscle as directed by prescriber 1 (One) Time Per Week. 40mg weekly, Provide(#4)18G,21Gneedles,3mlsyringes 2 mL 5   • [DISCONTINUED] cabergoline (DOSTINEX) 0.5 MG tablet Take 1/2 tablet onThursday 8 tablet 11     Facility-Administered Encounter Medications as of 4/27/2020   Medication Dose Route Frequency Provider Last Rate Last Dose   • testosterone cypionate (DEPO-TESTOSTERONE) injection solution 100 mg  100 mg Intramuscular Weekly Murdock, DEEPA Crespo   100 mg at 11/07/19 1713       Orders placed during this encounter include:  Orders Placed This Encounter   Procedures   • Comprehensive Metabolic Panel   • Vitamin D 25 Hydroxy   • T4, Free   • Testosterone   • Prolactin   • CBC & Differential     Order Specific Question:   Manual Differential     Answer:   No     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           Central hypothyroidism                 Levothyroxine 100 mcg daily        Component      Latest Ref Rng & Units 1/27/2020   Free T4      0.93 - 1.70 ng/dL 1.22         -----------------------------              Hypogonadotropic Hypogonadism     Testosterone cypionate         Off treatment            Component      Latest Ref Rng & Units 1/27/2020   Testosterone, Total      193.00 - 740.00 ng/dL 94.60 (L)          bone health     Vitamin d def.     Reassess          -------------------------------------------------------------------     Was previously on hydrocortisone for adrenal insufficiency that westopped and demonstrated he has intact adrenal axis     --     Normal IGF1     Elevated prolactin     pituitary tumor        Component      Latest Ref Rng & Units 5/25/2018 1/7/2019 5/23/2019 7/23/2019   Prolactin      4.04 - 15.20 ng/mL 28.73 (H) 21.12 (H) 68.00 (H) 0.33 (L)         dostinex 0.5 mg 1/2 tablet po twice a week -- decrease to once a week        Component      Latest Ref Rng & Units 10/21/2019   Prolactin      4.04 - 15.20 ng/mL  14.60         Keep dostinex 0.5 mg 1/2 tablet weekly        Component      Latest Ref Rng & Units 1/27/2020   Prolactin      4.04 - 15.20 ng/mL 0.60 (L)            Keep 1/2 weekly      Has repeat MRI in Dec. 2019         He meet with neurosurgeon and states the tumor was never completely removed just shaved                                  MRI from Nov. 2019      1.36 pituitary macroadenoma previously measuring 1.5 cm in June 2019        We spent 15 minutes     I reviewed the patients electronic chart, reviewed medications, reviewed past and current labs, and active problems      I provided advice regarding thyroid and pituitary disease management, refilled medications today, order future labs and made future appointment     Patient was advised to contact the office if there were any unanswered questions or any concerns      4. Follow-up: Return in about 3 months (around 7/27/2020).

## 2020-07-01 ENCOUNTER — LAB (OUTPATIENT)
Dept: LAB | Facility: OTHER | Age: 80
End: 2020-07-01

## 2020-07-01 LAB
25(OH)D3 SERPL-MCNC: 47.2 NG/ML (ref 30–100)
ALBUMIN SERPL-MCNC: 4.3 G/DL (ref 3.5–5)
ALBUMIN/GLOB SERPL: 1.4 G/DL (ref 1.1–1.8)
ALP SERPL-CCNC: 67 U/L (ref 38–126)
ALT SERPL W P-5'-P-CCNC: 28 U/L
ANION GAP SERPL CALCULATED.3IONS-SCNC: 8 MMOL/L (ref 5–15)
AST SERPL-CCNC: 33 U/L (ref 17–59)
BILIRUB SERPL-MCNC: 0.8 MG/DL (ref 0.2–1.3)
BUN SERPL-MCNC: 29 MG/DL (ref 7–23)
BUN/CREAT SERPL: 26.4 (ref 7–25)
CALCIUM SPEC-SCNC: 9.8 MG/DL (ref 8.4–10.2)
CHLORIDE SERPL-SCNC: 98 MMOL/L (ref 101–112)
CO2 SERPL-SCNC: 33 MMOL/L (ref 22–30)
CREAT SERPL-MCNC: 1.1 MG/DL (ref 0.7–1.3)
DEPRECATED RDW RBC AUTO: 44.8 FL (ref 37–54)
EOSINOPHIL # BLD MANUAL: 0.11 10*3/MM3 (ref 0–0.4)
EOSINOPHIL NFR BLD MANUAL: 2 % (ref 0.3–6.2)
ERYTHROCYTE [DISTWIDTH] IN BLOOD BY AUTOMATED COUNT: 12.8 % (ref 12.3–15.4)
GFR SERPL CREATININE-BSD FRML MDRD: 64 ML/MIN/1.73 (ref 42–98)
GLOBULIN UR ELPH-MCNC: 3 GM/DL (ref 2.3–3.5)
GLUCOSE SERPL-MCNC: 105 MG/DL (ref 70–99)
HCT VFR BLD AUTO: 45.6 % (ref 37.5–51)
HGB BLD-MCNC: 15.4 G/DL (ref 13–17.7)
LYMPHOCYTES # BLD MANUAL: 1.83 10*3/MM3 (ref 0.7–3.1)
LYMPHOCYTES NFR BLD MANUAL: 32 % (ref 19.6–45.3)
LYMPHOCYTES NFR BLD MANUAL: 9 % (ref 5–12)
MCH RBC QN AUTO: 33.2 PG (ref 26.6–33)
MCHC RBC AUTO-ENTMCNC: 33.8 G/DL (ref 31.5–35.7)
MCV RBC AUTO: 98.3 FL (ref 79–97)
MONOCYTES # BLD AUTO: 0.51 10*3/MM3 (ref 0.1–0.9)
NEUTROPHILS # BLD AUTO: 3.25 10*3/MM3 (ref 1.7–7)
NEUTROPHILS NFR BLD MANUAL: 57 % (ref 42.7–76)
PLATELET # BLD AUTO: 165 10*3/MM3 (ref 140–450)
PMV BLD AUTO: 9.7 FL (ref 6–12)
POTASSIUM SERPL-SCNC: 4 MMOL/L (ref 3.4–5)
PROLACTIN SERPL-MCNC: 0.48 NG/ML (ref 4.04–15.2)
PROT SERPL-MCNC: 7.3 G/DL (ref 6.3–8.6)
RBC # BLD AUTO: 4.64 10*6/MM3 (ref 4.14–5.8)
RBC MORPH BLD: NORMAL
SMALL PLATELETS BLD QL SMEAR: ADEQUATE
SODIUM SERPL-SCNC: 139 MMOL/L (ref 137–145)
T4 FREE SERPL-MCNC: 1.09 NG/DL (ref 0.93–1.7)
TESTOST SERPL-MCNC: 53.3 NG/DL (ref 193–740)
WBC # BLD AUTO: 5.71 10*3/MM3 (ref 3.4–10.8)
WBC MORPH BLD: NORMAL

## 2020-07-01 PROCEDURE — 85025 COMPLETE CBC W/AUTO DIFF WBC: CPT | Performed by: NURSE PRACTITIONER

## 2020-07-01 PROCEDURE — 82306 VITAMIN D 25 HYDROXY: CPT | Performed by: NURSE PRACTITIONER

## 2020-07-01 PROCEDURE — 84439 ASSAY OF FREE THYROXINE: CPT | Performed by: NURSE PRACTITIONER

## 2020-07-01 PROCEDURE — 84146 ASSAY OF PROLACTIN: CPT | Performed by: NURSE PRACTITIONER

## 2020-07-01 PROCEDURE — 36415 COLL VENOUS BLD VENIPUNCTURE: CPT | Performed by: NURSE PRACTITIONER

## 2020-07-01 PROCEDURE — 84403 ASSAY OF TOTAL TESTOSTERONE: CPT | Performed by: NURSE PRACTITIONER

## 2020-07-01 PROCEDURE — 80053 COMPREHEN METABOLIC PANEL: CPT | Performed by: NURSE PRACTITIONER

## 2020-07-09 ENCOUNTER — OFFICE VISIT (OUTPATIENT)
Dept: ENDOCRINOLOGY | Facility: CLINIC | Age: 80
End: 2020-07-09

## 2020-07-09 VITALS
SYSTOLIC BLOOD PRESSURE: 122 MMHG | WEIGHT: 194 LBS | OXYGEN SATURATION: 98 % | HEIGHT: 70 IN | BODY MASS INDEX: 27.77 KG/M2 | DIASTOLIC BLOOD PRESSURE: 58 MMHG | HEART RATE: 75 BPM

## 2020-07-09 DIAGNOSIS — E23.0 HYPOGONADOTROPIC HYPOGONADISM (HCC): ICD-10-CM

## 2020-07-09 DIAGNOSIS — R73.01 IMPAIRED FASTING GLUCOSE: ICD-10-CM

## 2020-07-09 DIAGNOSIS — E03.8 CENTRAL HYPOTHYROIDISM: ICD-10-CM

## 2020-07-09 DIAGNOSIS — E23.0 PANHYPOPITUITARISM (HCC): Primary | ICD-10-CM

## 2020-07-09 DIAGNOSIS — E55.9 VITAMIN D DEFICIENCY, UNSPECIFIED: ICD-10-CM

## 2020-07-09 PROCEDURE — 99214 OFFICE O/P EST MOD 30 MIN: CPT | Performed by: NURSE PRACTITIONER

## 2020-07-09 RX ORDER — LEVOTHYROXINE SODIUM 112 UG/1
112 TABLET ORAL DAILY
Qty: 30 TABLET | Refills: 11 | Status: SHIPPED | OUTPATIENT
Start: 2020-07-09 | End: 2021-06-17

## 2020-07-09 NOTE — PROGRESS NOTES
Subjective    Simeon Robin is a 79 y.o. male. he is here today for follow-up.    History of Present Illness     IN OFFICE VISIT        Primary Care Provider     Yina Daniels, DEEPA        79 year old male presents for         Hypopituitarism        1.5 cm pituitary adenoma         Previous   Context, 2.8 cm pituitary adenoma     , Hypophysectomy July 26, 2018 June 2019        MRI --- 1.5 cm pituitary microadenoma      Nov. 2019      1.3 cm         Elevated prolactin            Alleviating - Dostinex      Timing, constant     Quality , controlled on  hormone replacement     Severity, moderate     Symptoms -- fatigue, sweating at times, eating more      Central hypothyroidism     Currently on Levothyroxine 100 mcg daily        Hypogonadism     Stopped treatment due to side effects                  Procedure: MRI head with and without contrast with pituitary  protocol on 6/6/2019     CLINICAL INDICATION: Elevated prolactin level, history of brain  tumor removed one year ago     TECHNIQUE: Multiplanar, multisequence MR images are obtained  throughout the head both prior to and following the  administration of IV gadolinium contrast. Thin section pre and  post contrast imaging through the sella is performed.   This examination was performed on a 1.5 Amber magnet.     COMPARISON: None     FINDINGS: There is a 1.3 x 1.0 x 1.5 cm sellar and suprasellar  mass consistent with a pituitary macroadenoma. This approaches  but does not definitely contact or impinge upon the optic chiasm  at this time. Sagittal midline view shows an otherwise normal  appearance of the midline structures. There is a normal  appearance of the craniovertebral junction. Flow related signal  within the major intracranial vessels is preserved. There is no  hydrocephalus. There is mild generalized cerebral atrophy. There  is minimal T2 signal abnormality in the periventricular white  matter consistent with minimal chronic small vessel  ischemic  changes. Diffusion weighted imaging shows no evidence of acute  infarct. There is enhancement of the macroadenoma that appears  relatively homogeneous. There is no other mass and no mass effect  or midline shift. There is no other pathologic contrast  enhancement.     IMPRESSION:  1. 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  2. Mild atrophy and minimal chronic small vessel ischemic  changes.     Electronically signed by:  Aram Young  6/7/2019 12:03 AM  CDT Workstation: [id number]   Imaging      MRI Pituitary With & Without Contrast (Order: [id number]) - 6/6/2019   Reprint Order Requisition      MRI Pituitary With & Without Contrast (Order #[id number]) on 6/6/19   Result Notes for MRI Pituitary With & Without Contrast      Notes recorded by Oseas Huizar APRN on 6/12/2019 at 9:32 AM CDT  Discussed results with patient;medication called into [facility]  ------    Notes recorded by Topher De Luna MD on 6/11/2019 at 12:21 PM CDT  Please tell him that from what we understand he was told to have a nonfunctional tumor but it is possible that it is making prolactin .   It is also possible that it is truly nonfunctional and the prolactin elevation is stalk effect but it is too high in my experience for this.     I think we should start cabergoline 0.5 mg tabs, half a tab on Monday and Thursday. Repeat labs with appt every 2 months.     Repeat MRI in 6 months. If it shrinks w prolactin normalization  it was a prolactinoma. 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     • hydrochlorothiazide (HYDRODIURIL) 25 MG tablet Take 50 mg by mouth Daily.     • levothyroxine (SYNTHROID, LEVOTHROID) 100 MCG tablet TAKE 1 TABLET BY MOUTH EVERY DAY 30 tablet 6   • losartan (COZAAR) 100 MG tablet Take 100 mg by mouth Daily.     • rosuvastatin (CRESTOR) 5 MG tablet Take 5 mg by mouth Daily.     • levothyroxine (Synthroid) 112 MCG tablet Take 1 tablet by mouth Daily. 30 tablet 11   • testosterone cypionate (DEPO-TESTOSTERONE) 100 MG/ML solution injection Inject 1 mL into the appropriate muscle as directed by prescriber 1 (One) Time Per Week. 40mg weekly, Provide(#4)18G,21Gneedles,3mlsyringes 2 mL 5     Current Facility-Administered Medications   Medication Dose Route Frequency Provider Last Rate Last Dose   • testosterone cypionate (DEPO-TESTOSTERONE) injection solution 100 mg  100 mg Intramuscular Weekly Murdock, Kristina JarquinDEEPA   100 mg at 11/07/19 1713     Allergies   Allergen Reactions   • Donepezil Other (See Comments) and Unknown (See Comments)     Bad dreams  Bad dreams     • Naproxen Sodium Palpitations     Makes   Blood pressure     Social History     Socioeconomic History   • Marital status: Unknown     Spouse name: Not on file   • Number of children: Not on file   • Years of education: Not on file   • Highest education level: Not on file   Tobacco Use   • Smoking status: Never Smoker   • Smokeless tobacco: Never Used   Substance and Sexual Activity   • Alcohol use: No     Frequency: Never   • Drug use: No   • Sexual activity: Never       Review of Systems  Review of Systems   Constitutional: Negative for activity change, appetite  "change, diaphoresis and fatigue.   HENT: Negative for facial swelling, sneezing, sore throat, tinnitus, trouble swallowing and voice change.    Eyes: Negative for photophobia, pain, discharge, redness, itching and visual disturbance.   Respiratory: Negative for apnea, cough, choking, chest tightness and shortness of breath.    Cardiovascular: Negative for chest pain, palpitations and leg swelling.   Gastrointestinal: Negative for abdominal distention, abdominal pain, constipation, diarrhea, nausea and vomiting.   Endocrine: Negative for cold intolerance, heat intolerance, polydipsia, polyphagia and polyuria.   Genitourinary: Negative for difficulty urinating, dysuria, frequency, hematuria and urgency.   Musculoskeletal: Negative for arthralgias, back pain, gait problem, joint swelling, myalgias, neck pain and neck stiffness.   Skin: Negative for color change, pallor, rash and wound.   Neurological: Negative for dizziness, tremors, weakness, light-headedness, numbness and headaches.   Hematological: Negative for adenopathy. Does not bruise/bleed easily.   Psychiatric/Behavioral: Negative for behavioral problems, confusion and sleep disturbance.        Objective    /58   Pulse 75   Ht 176.5 cm (69.5\")   Wt 88 kg (194 lb)   SpO2 98%   BMI 28.24 kg/m²   Physical Exam   Constitutional: He is oriented to person, place, and time. He appears well-developed and well-nourished. No distress.   HENT:   Head: Normocephalic and atraumatic.   Right Ear: External ear normal.   Left Ear: External ear normal.   Nose: Nose normal.   Eyes: Pupils are equal, round, and reactive to light. Conjunctivae and EOM are normal.   Neck: Normal range of motion. Neck supple. No tracheal deviation present. No thyromegaly present.   Cardiovascular: Normal rate, regular rhythm and normal heart sounds.   No murmur heard.  Pulmonary/Chest: Effort normal and breath sounds normal. No respiratory distress. He has no wheezes.   Abdominal: Soft. " Bowel sounds are normal. There is no tenderness. There is no rebound and no guarding.   Musculoskeletal: Normal range of motion. He exhibits no edema, tenderness or deformity.   Neurological: He is alert and oriented to person, place, and time. No cranial nerve deficit.   Skin: Skin is warm and dry. No rash noted.   Psychiatric: He has a normal mood and affect. His behavior is normal. Judgment and thought content normal.       Lab Review  Glucose (mg/dL)   Date Value   07/01/2020 105 (H)   01/27/2020 110 (H)   10/21/2019 108 (H)     Sodium   Date Value   07/01/2020 139 mmol/L   01/27/2020 142 mmol/L   10/21/2019 142 mmol/L   01/07/2019 141 mmol/L   07/27/2018 138 MMOL/L   05/25/2018 139 mmol/L   01/29/2018 136 mmol/L     Potassium   Date Value   07/01/2020 4.0 mmol/L   01/27/2020 4.3 mmol/L   10/21/2019 4.0 mmol/L   01/07/2019 3.8 mmol/L   07/27/2018 3.9 MMOL/L   05/25/2018 3.7 mmol/L   01/29/2018 4.2 mmol/L     Chloride   Date Value   07/01/2020 98 mmol/L (L)   01/27/2020 103 mmol/L   10/21/2019 101 mmol/L   01/07/2019 104 mmol/L   07/27/2018 98 MMOL/L   05/25/2018 101 mmol/L   01/29/2018 100 mmol/L     CO2   Date Value   07/01/2020 33.0 mmol/L (H)   01/27/2020 34.0 mmol/L (H)   10/21/2019 32.0 mmol/L (H)   07/27/2018 28 MMOL/L     BUN   Date Value   07/01/2020 29 mg/dL (H)   01/27/2020 21 mg/dL   10/21/2019 29 mg/dL (H)   01/07/2019 20 mg/dL (H)   07/27/2018 20 MG/DL   05/25/2018 23 mg/dL (H)     Creatinine   Date Value   07/01/2020 1.10 mg/dL   01/27/2020 1.04 mg/dL   10/21/2019 1.25 mg/dL   01/07/2019 1.1 mg/dL   07/27/2018 1.0 MG/DL   05/25/2018 1.1 mg/dL   01/29/2018 1.1 mg/dL     Triglycerides (mg/dL)   Date Value   01/07/2019 314 (H)   05/25/2018 466 (H)     LDL Cholesterol  (mg/dL)   Date Value   01/07/2019 103 (H)   05/25/2018 94       Assessment/Plan      1. Panhypopituitarism (CMS/HCC)    2. Central hypothyroidism    3. Hypogonadotropic hypogonadism (CMS/HCC)    4. Impaired fasting glucose    5. Vitamin  D deficiency, unspecified     .    Medications prescribed:  Outpatient Encounter Medications as of 7/9/2020   Medication Sig Dispense Refill   • cabergoline (DOSTINEX) 0.5 MG tablet Take 1/2 tablet onThursday 8 tablet 11   • ferrous gluconate 324 (37.5 Fe) MG tablet tablet Take 324 mg by mouth Daily With Breakfast.     • hydrochlorothiazide (HYDRODIURIL) 25 MG tablet Take 50 mg by mouth Daily.     • levothyroxine (SYNTHROID, LEVOTHROID) 100 MCG tablet TAKE 1 TABLET BY MOUTH EVERY DAY 30 tablet 6   • losartan (COZAAR) 100 MG tablet Take 100 mg by mouth Daily.     • rosuvastatin (CRESTOR) 5 MG tablet Take 5 mg by mouth Daily.     • levothyroxine (Synthroid) 112 MCG tablet Take 1 tablet by mouth Daily. 30 tablet 11   • testosterone cypionate (DEPO-TESTOSTERONE) 100 MG/ML solution injection Inject 1 mL into the appropriate muscle as directed by prescriber 1 (One) Time Per Week. 40mg weekly, Provide(#4)18G,21Gneedles,3mlsyringes 2 mL 5     Facility-Administered Encounter Medications as of 7/9/2020   Medication Dose Route Frequency Provider Last Rate Last Dose   • testosterone cypionate (DEPO-TESTOSTERONE) injection solution 100 mg  100 mg Intramuscular Weekly Murdock, DEEPA Crespo   100 mg at 11/07/19 1713       Orders placed during this encounter include:  Orders Placed This Encounter   Procedures   • Comprehensive Metabolic Panel   • Hemoglobin A1c   • Vitamin D 25 Hydroxy   • Vitamin B12   • Prolactin   • T4, Free   • CBC & Differential     Order Specific Question:   Manual Differential     Answer:   No     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           Central hypothyroidism                  Levothyroxine 100 mcg daily             Component      Latest Ref Rng & Units 7/1/2020   Free T4      0.93 - 1.70 ng/dL 1.09       Increase to 112 mcg   -----------------------------              Hypogonadotropic Hypogonadism     Testosterone cypionate         Off treatment            Component      Latest Ref Rng & Units  1/27/2020   Testosterone, Total      193.00 - 740.00 ng/dL 94.60 (L)         Component      Latest Ref Rng & Units 7/1/2020   Testosterone, Total      193.00 - 740.00 ng/dL 53.30 (L)      bone health      Vitamin d def.      Reassess        Component      Latest Ref Rng & Units 7/1/2020   25 Hydroxy, Vitamin D      30.0 - 100.0 ng/ml 47.2        -------------------------------------------------------------------     Was previously on hydrocortisone for adrenal insufficiency that westopped and demonstrated he has intact adrenal axis     --     Normal IGF1     Elevated prolactin     pituitary tumor        Component      Latest Ref Rng & Units 5/25/2018 1/7/2019 5/23/2019 7/23/2019   Prolactin      4.04 - 15.20 ng/mL 28.73 (H) 21.12 (H) 68.00 (H) 0.33 (L)         dostinex 0.5 mg 1/2 tablet po twice a week -- decrease to once a week        Component      Latest Ref Rng & Units 10/21/2019   Prolactin      4.04 - 15.20 ng/mL 14.60         Keep dostinex 0.5 mg 1/2 tablet weekly        Component      Latest Ref Rng & Units 1/27/2020   Prolactin      4.04 - 15.20 ng/mL 0.60 (L)            Keep 1/2 weekly       Component      Latest Ref Rng & Units 7/1/2020   Prolactin      4.04 - 15.20 ng/mL 0.48 (L)          Had repeat MRI in Dec. 2019         He meet with neurosurgeon and states the tumor was never completely removed just shaved                                  MRI from Nov. 2019      1.36 pituitary macroadenoma previously measuring 1.5 cm in June 2019            repeat Nov. 2020           IMpaired fasting glucose    106    Add A1c             4. Follow-up: Return in about 3 months (around 10/9/2020) for Recheck.

## 2020-10-08 ENCOUNTER — LAB (OUTPATIENT)
Dept: LAB | Facility: OTHER | Age: 80
End: 2020-10-08

## 2020-10-08 LAB
ALBUMIN SERPL-MCNC: 4.3 G/DL (ref 3.5–5)
ALBUMIN/GLOB SERPL: 1.4 G/DL (ref 1.1–1.8)
ALP SERPL-CCNC: 64 U/L (ref 38–126)
ALT SERPL W P-5'-P-CCNC: 36 U/L
ANION GAP SERPL CALCULATED.3IONS-SCNC: 8 MMOL/L (ref 5–15)
AST SERPL-CCNC: 35 U/L (ref 17–59)
BASOPHILS # BLD MANUAL: 0.05 10*3/MM3 (ref 0–0.2)
BASOPHILS NFR BLD AUTO: 1 % (ref 0–1.5)
BILIRUB SERPL-MCNC: 0.8 MG/DL (ref 0.2–1.3)
BUN SERPL-MCNC: 20 MG/DL (ref 7–23)
BUN/CREAT SERPL: 20.4 (ref 7–25)
CALCIUM SPEC-SCNC: 9.6 MG/DL (ref 8.4–10.2)
CHLORIDE SERPL-SCNC: 103 MMOL/L (ref 101–112)
CO2 SERPL-SCNC: 32 MMOL/L (ref 22–30)
CREAT SERPL-MCNC: 0.98 MG/DL (ref 0.7–1.3)
DEPRECATED RDW RBC AUTO: 45.4 FL (ref 37–54)
EOSINOPHIL # BLD MANUAL: 0.15 10*3/MM3 (ref 0–0.4)
EOSINOPHIL NFR BLD MANUAL: 3 % (ref 0.3–6.2)
ERYTHROCYTE [DISTWIDTH] IN BLOOD BY AUTOMATED COUNT: 13 % (ref 12.3–15.4)
GFR SERPL CREATININE-BSD FRML MDRD: 73 ML/MIN/1.73 (ref 42–98)
GLOBULIN UR ELPH-MCNC: 3 GM/DL (ref 2.3–3.5)
GLUCOSE SERPL-MCNC: 102 MG/DL (ref 70–99)
HCT VFR BLD AUTO: 44.7 % (ref 37.5–51)
HGB BLD-MCNC: 15 G/DL (ref 13–17.7)
LYMPHOCYTES # BLD MANUAL: 1.47 10*3/MM3 (ref 0.7–3.1)
LYMPHOCYTES NFR BLD MANUAL: 11 % (ref 5–12)
LYMPHOCYTES NFR BLD MANUAL: 30 % (ref 19.6–45.3)
MCH RBC QN AUTO: 32.8 PG (ref 26.6–33)
MCHC RBC AUTO-ENTMCNC: 33.6 G/DL (ref 31.5–35.7)
MCV RBC AUTO: 97.6 FL (ref 79–97)
MONOCYTES # BLD AUTO: 0.54 10*3/MM3 (ref 0.1–0.9)
NEUTROPHILS # BLD AUTO: 2.69 10*3/MM3 (ref 1.7–7)
NEUTROPHILS NFR BLD MANUAL: 55 % (ref 42.7–76)
PLATELET # BLD AUTO: 162 10*3/MM3 (ref 140–450)
PMV BLD AUTO: 9.3 FL (ref 6–12)
POTASSIUM SERPL-SCNC: 3.9 MMOL/L (ref 3.4–5)
PROT SERPL-MCNC: 7.3 G/DL (ref 6.3–8.6)
RBC # BLD AUTO: 4.58 10*6/MM3 (ref 4.14–5.8)
RBC MORPH BLD: NORMAL
SMALL PLATELETS BLD QL SMEAR: ADEQUATE
SODIUM SERPL-SCNC: 143 MMOL/L (ref 137–145)
WBC # BLD AUTO: 4.89 10*3/MM3 (ref 3.4–10.8)
WBC MORPH BLD: NORMAL

## 2020-10-08 PROCEDURE — 84146 ASSAY OF PROLACTIN: CPT | Performed by: NURSE PRACTITIONER

## 2020-10-08 PROCEDURE — 36415 COLL VENOUS BLD VENIPUNCTURE: CPT | Performed by: NURSE PRACTITIONER

## 2020-10-08 PROCEDURE — 85025 COMPLETE CBC W/AUTO DIFF WBC: CPT | Performed by: NURSE PRACTITIONER

## 2020-10-08 PROCEDURE — 83036 HEMOGLOBIN GLYCOSYLATED A1C: CPT | Performed by: NURSE PRACTITIONER

## 2020-10-08 PROCEDURE — 84439 ASSAY OF FREE THYROXINE: CPT | Performed by: NURSE PRACTITIONER

## 2020-10-08 PROCEDURE — 82607 VITAMIN B-12: CPT | Performed by: NURSE PRACTITIONER

## 2020-10-08 PROCEDURE — 80053 COMPREHEN METABOLIC PANEL: CPT | Performed by: NURSE PRACTITIONER

## 2020-10-08 PROCEDURE — 82306 VITAMIN D 25 HYDROXY: CPT | Performed by: NURSE PRACTITIONER

## 2020-10-09 LAB
25(OH)D3 SERPL-MCNC: 46.6 NG/ML (ref 30–100)
HBA1C MFR BLD: 5.28 % (ref 4.8–5.6)
PROLACTIN SERPL-MCNC: 0.43 NG/ML (ref 4.04–15.2)
T4 FREE SERPL-MCNC: 1.43 NG/DL (ref 0.93–1.7)
VIT B12 BLD-MCNC: 1493 PG/ML (ref 211–946)

## 2020-10-13 ENCOUNTER — OFFICE VISIT (OUTPATIENT)
Dept: ENDOCRINOLOGY | Facility: CLINIC | Age: 80
End: 2020-10-13

## 2020-10-13 VITALS
WEIGHT: 194.4 LBS | DIASTOLIC BLOOD PRESSURE: 64 MMHG | HEIGHT: 70 IN | BODY MASS INDEX: 27.83 KG/M2 | HEART RATE: 83 BPM | SYSTOLIC BLOOD PRESSURE: 150 MMHG | OXYGEN SATURATION: 98 %

## 2020-10-13 DIAGNOSIS — E03.8 CENTRAL HYPOTHYROIDISM: Chronic | ICD-10-CM

## 2020-10-13 DIAGNOSIS — E23.0 PANHYPOPITUITARISM (HCC): ICD-10-CM

## 2020-10-13 DIAGNOSIS — E55.9 VITAMIN D DEFICIENCY, UNSPECIFIED: ICD-10-CM

## 2020-10-13 DIAGNOSIS — Z98.890 HISTORY OF PITUITARY SURGERY: Primary | Chronic | ICD-10-CM

## 2020-10-13 DIAGNOSIS — E23.0 HYPOGONADOTROPIC HYPOGONADISM (HCC): Chronic | ICD-10-CM

## 2020-10-13 DIAGNOSIS — R73.01 IMPAIRED FASTING GLUCOSE: ICD-10-CM

## 2020-10-13 DIAGNOSIS — E22.1 HYPERPROLACTINEMIA (HCC): ICD-10-CM

## 2020-10-13 PROCEDURE — 99214 OFFICE O/P EST MOD 30 MIN: CPT | Performed by: NURSE PRACTITIONER

## 2020-10-13 NOTE — PROGRESS NOTES
Subjective    Simeon Robin is a 80 y.o. male. he is here today for follow-up.    History of Present Illness     IN OFFICE VISIT     Primary Care Provider     Yina Daniels, DEEPA        80 year old male presents for         Hypopituitarism        1.5 cm pituitary adenoma         Previous   Context, 2.8 cm pituitary adenoma     , Hypophysectomy July 26, 2018 June 2019        MRI --- 1.5 cm pituitary microadenoma      Nov. 2019      1.3 cm         Elevated prolactin            Alleviating - Dostinex      Timing, constant     Quality , controlled on  hormone replacement     Severity, moderate     Symptoms -- none today      Central hypothyroidism     Currently on Levothyroxine 112 mcg daily        Hypogonadism     Stopped treatment due to side effects                  Procedure: MRI head with and without contrast with pituitary  protocol on 6/6/2019     CLINICAL INDICATION: Elevated prolactin level, history of brain  tumor removed one year ago     TECHNIQUE: Multiplanar, multisequence MR images are obtained  throughout the head both prior to and following the  administration of IV gadolinium contrast. Thin section pre and  post contrast imaging through the sella is performed.   This examination was performed on a 1.5 Amber magnet.     COMPARISON: None     FINDINGS: There is a 1.3 x 1.0 x 1.5 cm sellar and suprasellar  mass consistent with a pituitary macroadenoma. This approaches  but does not definitely contact or impinge upon the optic chiasm  at this time. Sagittal midline view shows an otherwise normal  appearance of the midline structures. There is a normal  appearance of the craniovertebral junction. Flow related signal  within the major intracranial vessels is preserved. There is no  hydrocephalus. There is mild generalized cerebral atrophy. There  is minimal T2 signal abnormality in the periventricular white  matter consistent with minimal chronic small vessel ischemic  changes. Diffusion  weighted imaging shows no evidence of acute  infarct. There is enhancement of the macroadenoma that appears  relatively homogeneous. There is no other mass and no mass effect  or midline shift. There is no other pathologic contrast  enhancement.     IMPRESSION:  1. 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  2. Mild atrophy and minimal chronic small vessel ischemic  changes.     Electronically signed by:  Aram Young  6/7/2019 12:03 AM  CDT Workstation: [id number]   Imaging      MRI Pituitary With & Without Contrast (Order: [id number]) - 6/6/2019   Reprint Order Requisition      MRI Pituitary With & Without Contrast (Order #[id number]) on 6/6/19   Result Notes for MRI Pituitary With & Without Contrast      Notes recorded by Oseas Huizar APRN on 6/12/2019 at 9:32 AM CDT  Discussed results with patient;medication called into [facility]  ------    Notes recorded by Topher De Luna MD on 6/11/2019 at 12:21 PM CDT  Please tell him that from what we understand he was told to have a nonfunctional tumor but it is possible that it is making prolactin .   It is also possible that it is truly nonfunctional and the prolactin elevation is stalk effect but it is too high in my experience for this.     I think we should start cabergoline 0.5 mg tabs, half a tab on Monday and Thursday. Repeat labs with appt every 2 months.     Repeat MRI in 6 months. If it shrinks w prolactin normalization  it was a prolactinoma. If not , it is nonfunctional and we need to reconsider repeat surgery  ------                  The following portions of the patient's history were reviewed and updated as appropriate:   Past Medical History:   Diagnosis Date   • Essential hypertension 12/9/2018   • History of pituitary surgery 12/9/2018   • Panhypopituitarism (CMS/HCC) 12/9/2018     Past Surgical History:   Procedure Laterality Date   • KIDNEY SURGERY     • TUMOR  REMOVAL       Family History   Problem Relation Age of Onset   • Hypertension Mother        Current Outpatient Medications   Medication Sig Dispense Refill   • cabergoline (DOSTINEX) 0.5 MG tablet Take 1/2 tablet onThursday 8 tablet 11   • ferrous gluconate 324 (37.5 Fe) MG tablet tablet Take 324 mg by mouth Daily With Breakfast.     • hydrochlorothiazide (HYDRODIURIL) 25 MG tablet Take 50 mg by mouth Daily.     • levothyroxine (Synthroid) 112 MCG tablet Take 1 tablet by mouth Daily. 30 tablet 11   • losartan (COZAAR) 100 MG tablet Take 100 mg by mouth Daily.     • rosuvastatin (CRESTOR) 5 MG tablet Take 5 mg by mouth Daily.     • levothyroxine (SYNTHROID, LEVOTHROID) 100 MCG tablet TAKE 1 TABLET BY MOUTH EVERY DAY 30 tablet 6   • testosterone cypionate (DEPO-TESTOSTERONE) 100 MG/ML solution injection Inject 1 mL into the appropriate muscle as directed by prescriber 1 (One) Time Per Week. 40mg weekly, Provide(#4)18G,21Gneedles,3mlsyringes 2 mL 5     Current Facility-Administered Medications   Medication Dose Route Frequency Provider Last Rate Last Dose   • testosterone cypionate (DEPO-TESTOSTERONE) injection solution 100 mg  100 mg Intramuscular Weekly Murdock, DEEPA Crespo   100 mg at 11/07/19 1713     Allergies   Allergen Reactions   • Donepezil Other (See Comments) and Unknown (See Comments)     Bad dreams  Bad dreams     • Naproxen Sodium Palpitations     Makes   Blood pressure     Social History     Socioeconomic History   • Marital status: Unknown     Spouse name: Not on file   • Number of children: Not on file   • Years of education: Not on file   • Highest education level: Not on file   Tobacco Use   • Smoking status: Never Smoker   • Smokeless tobacco: Never Used   Substance and Sexual Activity   • Alcohol use: No     Frequency: Never   • Drug use: No   • Sexual activity: Never       Review of Systems  Review of Systems   Constitutional: Negative for activity change, appetite change, diaphoresis and  "fatigue.   HENT: Negative for facial swelling, sneezing, sore throat, tinnitus, trouble swallowing and voice change.    Eyes: Negative for photophobia, pain, discharge, redness, itching and visual disturbance.   Respiratory: Negative for apnea, cough, choking, chest tightness and shortness of breath.    Cardiovascular: Negative for chest pain, palpitations and leg swelling.   Gastrointestinal: Negative for abdominal distention, abdominal pain, constipation, diarrhea, nausea and vomiting.   Endocrine: Negative for cold intolerance, heat intolerance, polydipsia, polyphagia and polyuria.   Genitourinary: Negative for difficulty urinating, dysuria, frequency, hematuria and urgency.   Musculoskeletal: Negative for arthralgias, back pain, gait problem, joint swelling, myalgias, neck pain and neck stiffness.   Skin: Negative for color change, pallor, rash and wound.   Neurological: Negative for dizziness, tremors, weakness, light-headedness, numbness and headaches.   Hematological: Negative for adenopathy. Does not bruise/bleed easily.   Psychiatric/Behavioral: Negative for behavioral problems, confusion and sleep disturbance.        Objective    /64   Pulse 83   Ht 176.5 cm (69.5\")   Wt 88.2 kg (194 lb 6.4 oz)   SpO2 98%   BMI 28.30 kg/m²   Physical Exam  Constitutional:       General: He is not in acute distress.     Appearance: He is well-developed.   HENT:      Head: Normocephalic and atraumatic.      Right Ear: External ear normal.      Left Ear: External ear normal.      Nose: Nose normal.   Eyes:      Conjunctiva/sclera: Conjunctivae normal.      Pupils: Pupils are equal, round, and reactive to light.   Neck:      Musculoskeletal: Normal range of motion and neck supple.      Thyroid: No thyromegaly.      Trachea: No tracheal deviation.   Cardiovascular:      Rate and Rhythm: Normal rate and regular rhythm.      Heart sounds: Normal heart sounds. No murmur.   Pulmonary:      Effort: Pulmonary effort is " normal. No respiratory distress.      Breath sounds: Normal breath sounds. No wheezing.   Abdominal:      General: Bowel sounds are normal.      Palpations: Abdomen is soft.      Tenderness: There is no abdominal tenderness. There is no guarding or rebound.   Musculoskeletal: Normal range of motion.         General: No tenderness or deformity.   Skin:     General: Skin is warm and dry.      Findings: No rash.   Neurological:      Mental Status: He is alert and oriented to person, place, and time.      Cranial Nerves: No cranial nerve deficit.   Psychiatric:         Behavior: Behavior normal.         Thought Content: Thought content normal.         Judgment: Judgment normal.         Lab Review  Glucose (mg/dL)   Date Value   10/08/2020 102 (H)   07/01/2020 105 (H)   01/27/2020 110 (H)     Sodium   Date Value   10/08/2020 143 mmol/L   07/01/2020 139 mmol/L   01/27/2020 142 mmol/L   01/07/2019 141 mmol/L   07/27/2018 138 MMOL/L   05/25/2018 139 mmol/L   01/29/2018 136 mmol/L     Potassium   Date Value   10/08/2020 3.9 mmol/L   07/01/2020 4.0 mmol/L   01/27/2020 4.3 mmol/L   01/07/2019 3.8 mmol/L   07/27/2018 3.9 MMOL/L   05/25/2018 3.7 mmol/L   01/29/2018 4.2 mmol/L     Chloride   Date Value   10/08/2020 103 mmol/L   07/01/2020 98 mmol/L (L)   01/27/2020 103 mmol/L   01/07/2019 104 mmol/L   07/27/2018 98 MMOL/L   05/25/2018 101 mmol/L   01/29/2018 100 mmol/L     CO2   Date Value   10/08/2020 32.0 mmol/L (H)   07/01/2020 33.0 mmol/L (H)   01/27/2020 34.0 mmol/L (H)   07/27/2018 28 MMOL/L     BUN   Date Value   10/08/2020 20 mg/dL   07/01/2020 29 mg/dL (H)   01/27/2020 21 mg/dL   01/07/2019 20 mg/dL (H)   07/27/2018 20 MG/DL   05/25/2018 23 mg/dL (H)     Creatinine   Date Value   10/08/2020 0.98 mg/dL   07/01/2020 1.10 mg/dL   01/27/2020 1.04 mg/dL   01/07/2019 1.1 mg/dL   07/27/2018 1.0 MG/DL   05/25/2018 1.1 mg/dL   01/29/2018 1.1 mg/dL     Hemoglobin A1C (%)   Date Value   10/08/2020 5.28     Triglycerides (mg/dL)    Date Value   01/07/2019 314 (H)   05/25/2018 466 (H)     LDL Cholesterol  (mg/dL)   Date Value   01/07/2019 103 (H)   05/25/2018 94       Assessment/Plan      1. History of pituitary surgery    2. Central hypothyroidism    3. Panhypopituitarism (CMS/HCC)    4. Hypogonadotropic hypogonadism (CMS/HCC)    5. Hyperprolactinemia (CMS/HCC)    6. Impaired fasting glucose    7. Vitamin D deficiency, unspecified     .    Medications prescribed:  Outpatient Encounter Medications as of 10/13/2020   Medication Sig Dispense Refill   • cabergoline (DOSTINEX) 0.5 MG tablet Take 1/2 tablet onThursday 8 tablet 11   • ferrous gluconate 324 (37.5 Fe) MG tablet tablet Take 324 mg by mouth Daily With Breakfast.     • hydrochlorothiazide (HYDRODIURIL) 25 MG tablet Take 50 mg by mouth Daily.     • levothyroxine (Synthroid) 112 MCG tablet Take 1 tablet by mouth Daily. 30 tablet 11   • losartan (COZAAR) 100 MG tablet Take 100 mg by mouth Daily.     • rosuvastatin (CRESTOR) 5 MG tablet Take 5 mg by mouth Daily.     • levothyroxine (SYNTHROID, LEVOTHROID) 100 MCG tablet TAKE 1 TABLET BY MOUTH EVERY DAY 30 tablet 6   • testosterone cypionate (DEPO-TESTOSTERONE) 100 MG/ML solution injection Inject 1 mL into the appropriate muscle as directed by prescriber 1 (One) Time Per Week. 40mg weekly, Provide(#4)18G,21Gneedles,3mlsyringes 2 mL 5     Facility-Administered Encounter Medications as of 10/13/2020   Medication Dose Route Frequency Provider Last Rate Last Dose   • testosterone cypionate (DEPO-TESTOSTERONE) injection solution 100 mg  100 mg Intramuscular Weekly Murdock, DEEPA Crespo   100 mg at 11/07/19 1713       Orders placed during this encounter include:  Orders Placed This Encounter   Procedures   • MRI Pituitary With & Without Contrast     Standing Status:   Future     Standing Expiration Date:   10/13/2021     Scheduling Instructions:      Schedule at [facility]   • Comprehensive Metabolic Panel     Standing Status:   Future      Standing Expiration Date:   10/13/2021   • Hemoglobin A1c     Standing Status:   Future     Standing Expiration Date:   10/13/2021   • T4, Free     Standing Status:   Future     Standing Expiration Date:   10/13/2021   • Prolactin     Standing Status:   Future     Standing Expiration Date:   10/13/2021   • Vitamin D 25 Hydroxy     Standing Status:   Future     Standing Expiration Date:   10/13/2021   • CBC & Differential     Standing Status:   Future     Standing Expiration Date:   10/13/2021     Order Specific Question:   Manual Differential     Answer:   No     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           Central hypothyroidism               Levothyroxine 112 mcg daily              Component      Latest Ref Rng & Units 10/8/2020   Free T4      0.93 - 1.70 ng/dL 1.43           Hypogonadotropic Hypogonadism     Testosterone cypionate         Off treatment            Component      Latest Ref Rng & Units 1/27/2020   Testosterone, Total      193.00 - 740.00 ng/dL 94.60 (L)         Component      Latest Ref Rng & Units 7/1/2020   Testosterone, Total      193.00 - 740.00 ng/dL 53.30 (L)       bone health      Vitamin d def.           Component      Latest Ref Rng & Units 10/8/2020   25 Hydroxy, Vitamin D      30.0 - 100.0 ng/ml 46.6             -------------------------------------------------------------------     Was previously on hydrocortisone for adrenal insufficiency that westopped and demonstrated he has intact adrenal axis     --     Normal IGF1     Elevated prolactin     pituitary tumor        Component      Latest Ref Rng & Units 5/25/2018 1/7/2019 5/23/2019 7/23/2019   Prolactin      4.04 - 15.20 ng/mL 28.73 (H) 21.12 (H) 68.00 (H) 0.33 (L)         dostinex 0.5 mg 1/2 tablet po twice a week -- decrease to once a week                   Keep 1/2 weekly           Component      Latest Ref Rng & Units 10/8/2020   Prolactin      4.04 - 15.20 ng/mL 0.43 (L)           Had repeat MRI in Dec. 2019         He  meet with neurosurgeon and states the tumor was never completely removed just shaved                                  MRI from Nov. 2019      1.36 pituitary macroadenoma previously measuring 1.5 cm in June 2019             repeat Nov. 2020           IMpaired fasting glucose     106         Lab Results   Component Value Date    HGBA1C 5.28 10/08/2020           4. Follow-up: Return in about 4 months (around 2/13/2021) for Recheck.

## 2021-02-03 ENCOUNTER — TRANSCRIBE ORDERS (OUTPATIENT)
Dept: GENERAL RADIOLOGY | Facility: CLINIC | Age: 81
End: 2021-02-03

## 2021-02-03 ENCOUNTER — LAB (OUTPATIENT)
Dept: LAB | Facility: OTHER | Age: 81
End: 2021-02-03

## 2021-02-03 DIAGNOSIS — Z13.29 ENCOUNTER FOR SCREENING FOR OTHER SUSPECTED ENDOCRINE DISORDER: ICD-10-CM

## 2021-02-03 DIAGNOSIS — E55.9 VITAMIN D DEFICIENCY, UNSPECIFIED: ICD-10-CM

## 2021-02-03 DIAGNOSIS — E03.8 CENTRAL HYPOTHYROIDISM: ICD-10-CM

## 2021-02-03 DIAGNOSIS — E22.1 HYPERPROLACTINEMIA (HCC): ICD-10-CM

## 2021-02-03 DIAGNOSIS — E23.0 HYPOGONADOTROPIC HYPOGONADISM (HCC): ICD-10-CM

## 2021-02-03 DIAGNOSIS — Z12.5 SCREENING FOR PROSTATE CANCER: ICD-10-CM

## 2021-02-03 DIAGNOSIS — Z12.5 SCREENING FOR PROSTATE CANCER: Primary | ICD-10-CM

## 2021-02-03 DIAGNOSIS — R73.01 IMPAIRED FASTING GLUCOSE: ICD-10-CM

## 2021-02-03 DIAGNOSIS — E23.0 PANHYPOPITUITARISM (HCC): ICD-10-CM

## 2021-02-03 DIAGNOSIS — Z98.890 HISTORY OF PITUITARY SURGERY: ICD-10-CM

## 2021-02-03 LAB
ALBUMIN SERPL-MCNC: 4.3 G/DL (ref 3.5–5)
ALBUMIN/GLOB SERPL: 1.4 G/DL (ref 1.1–1.8)
ALP SERPL-CCNC: 61 U/L (ref 38–126)
ALT SERPL W P-5'-P-CCNC: 40 U/L
ANION GAP SERPL CALCULATED.3IONS-SCNC: 6 MMOL/L (ref 5–15)
AST SERPL-CCNC: 42 U/L (ref 17–59)
BASOPHILS # BLD MANUAL: 0.05 10*3/MM3 (ref 0–0.2)
BASOPHILS NFR BLD AUTO: 1 % (ref 0–1.5)
BILIRUB SERPL-MCNC: 1.1 MG/DL (ref 0.2–1.3)
BUN SERPL-MCNC: 24 MG/DL (ref 7–23)
BUN/CREAT SERPL: 23.5 (ref 7–25)
CALCIUM SPEC-SCNC: 9.6 MG/DL (ref 8.4–10.2)
CHLORIDE SERPL-SCNC: 100 MMOL/L (ref 101–112)
CO2 SERPL-SCNC: 32 MMOL/L (ref 22–30)
CREAT SERPL-MCNC: 1.02 MG/DL (ref 0.7–1.3)
DEPRECATED RDW RBC AUTO: 43.7 FL (ref 37–54)
EOSINOPHIL # BLD MANUAL: 0.1 10*3/MM3 (ref 0–0.4)
EOSINOPHIL NFR BLD MANUAL: 2 % (ref 0.3–6.2)
ERYTHROCYTE [DISTWIDTH] IN BLOOD BY AUTOMATED COUNT: 13.1 % (ref 12.3–15.4)
GFR SERPL CREATININE-BSD FRML MDRD: 70 ML/MIN/1.73 (ref 42–98)
GLOBULIN UR ELPH-MCNC: 3.1 GM/DL (ref 2.3–3.5)
GLUCOSE SERPL-MCNC: 109 MG/DL (ref 70–99)
HCT VFR BLD AUTO: 46.3 % (ref 37.5–51)
HGB BLD-MCNC: 16 G/DL (ref 13–17.7)
LYMPHOCYTES # BLD MANUAL: 1.36 10*3/MM3 (ref 0.7–3.1)
LYMPHOCYTES NFR BLD MANUAL: 26 % (ref 19.6–45.3)
LYMPHOCYTES NFR BLD MANUAL: 9 % (ref 5–12)
MCH RBC QN AUTO: 32.8 PG (ref 26.6–33)
MCHC RBC AUTO-ENTMCNC: 34.6 G/DL (ref 31.5–35.7)
MCV RBC AUTO: 94.9 FL (ref 79–97)
MONOCYTES # BLD AUTO: 0.47 10*3/MM3 (ref 0.1–0.9)
NEUTROPHILS # BLD AUTO: 3.25 10*3/MM3 (ref 1.7–7)
NEUTROPHILS NFR BLD MANUAL: 62 % (ref 42.7–76)
PLATELET # BLD AUTO: 168 10*3/MM3 (ref 140–450)
PMV BLD AUTO: 9.5 FL (ref 6–12)
POTASSIUM SERPL-SCNC: 4.2 MMOL/L (ref 3.4–5)
PROT SERPL-MCNC: 7.4 G/DL (ref 6.3–8.6)
RBC # BLD AUTO: 4.88 10*6/MM3 (ref 4.14–5.8)
RBC MORPH BLD: NORMAL
SMALL PLATELETS BLD QL SMEAR: ADEQUATE
SODIUM SERPL-SCNC: 138 MMOL/L (ref 137–145)
WBC # BLD AUTO: 5.24 10*3/MM3 (ref 3.4–10.8)
WBC MORPH BLD: NORMAL

## 2021-02-03 PROCEDURE — 36415 COLL VENOUS BLD VENIPUNCTURE: CPT | Performed by: NURSE PRACTITIONER

## 2021-02-03 PROCEDURE — G0103 PSA SCREENING: HCPCS | Performed by: NURSE PRACTITIONER

## 2021-02-03 PROCEDURE — 84439 ASSAY OF FREE THYROXINE: CPT | Performed by: NURSE PRACTITIONER

## 2021-02-03 PROCEDURE — 82306 VITAMIN D 25 HYDROXY: CPT | Performed by: NURSE PRACTITIONER

## 2021-02-03 PROCEDURE — 80053 COMPREHEN METABOLIC PANEL: CPT | Performed by: NURSE PRACTITIONER

## 2021-02-03 PROCEDURE — 85025 COMPLETE CBC W/AUTO DIFF WBC: CPT | Performed by: NURSE PRACTITIONER

## 2021-02-03 PROCEDURE — 84443 ASSAY THYROID STIM HORMONE: CPT | Performed by: NURSE PRACTITIONER

## 2021-02-03 PROCEDURE — 83036 HEMOGLOBIN GLYCOSYLATED A1C: CPT | Performed by: NURSE PRACTITIONER

## 2021-02-03 PROCEDURE — 84146 ASSAY OF PROLACTIN: CPT | Performed by: NURSE PRACTITIONER

## 2021-02-04 LAB
25(OH)D3 SERPL-MCNC: 43.8 NG/ML (ref 30–100)
HBA1C MFR BLD: 5.4 % (ref 4.8–5.6)
PROLACTIN SERPL-MCNC: 0.8 NG/ML (ref 4.04–15.2)
PSA SERPL-MCNC: 0.4 NG/ML (ref 0–4)
T4 FREE SERPL-MCNC: 1.3 NG/DL (ref 0.93–1.7)
TSH SERPL DL<=0.05 MIU/L-ACNC: 0.33 UIU/ML (ref 0.27–4.2)

## 2021-02-10 ENCOUNTER — TELEMEDICINE (OUTPATIENT)
Dept: ENDOCRINOLOGY | Facility: CLINIC | Age: 81
End: 2021-02-10

## 2021-02-10 DIAGNOSIS — E03.8 CENTRAL HYPOTHYROIDISM: Primary | Chronic | ICD-10-CM

## 2021-02-10 DIAGNOSIS — R73.01 IMPAIRED FASTING GLUCOSE: ICD-10-CM

## 2021-02-10 DIAGNOSIS — E23.0 PANHYPOPITUITARISM (HCC): ICD-10-CM

## 2021-02-10 DIAGNOSIS — Z98.890 HISTORY OF PITUITARY SURGERY: Chronic | ICD-10-CM

## 2021-02-10 DIAGNOSIS — E55.9 VITAMIN D DEFICIENCY: ICD-10-CM

## 2021-02-10 PROCEDURE — 99214 OFFICE O/P EST MOD 30 MIN: CPT | Performed by: NURSE PRACTITIONER

## 2021-02-10 NOTE — PROGRESS NOTES
Chief Complaint  Pituitary Problem    Subjective          Kingt CAMILLE Robin presents to [facility] ENDOCRINOLOGY  History of Present Illness     You have chosen to receive care through a telehealth visit.  Do you consent to use a video/audio connection for your medical care today? Yes        TELEHEALTH VIDEO VISIT     This a video visit due to [facility] current guidelines for social distancing due to the COVID 19 pandemic     Primary Care Provider     Yina Daniels, DEEPA        80 year old male presents for         Hypopituitarism        1.5 cm pituitary adenoma         Previous   Context, 2.8 cm pituitary adenoma     , Hypophysectomy July 26, 2018 June 2019        MRI --- 1.5 cm pituitary microadenoma      Nov. 2019      1.3 cm         Elevated prolactin            Alleviating - Dostinex      Timing, constant     Quality , controlled on  hormone replacement     Severity, moderate     Symptoms -- none today      Central hypothyroidism     Currently on Levothyroxine 112 mcg daily        Hypogonadism     Stopped treatment due to side effects             No changes noted     Procedure: MRI head with and without contrast with pituitary  protocol on 6/6/2019     CLINICAL INDICATION: Elevated prolactin level, history of brain  tumor removed one year ago     TECHNIQUE: Multiplanar, multisequence MR images are obtained  throughout the head both prior to and following the  administration of IV gadolinium contrast. Thin section pre and  post contrast imaging through the sella is performed.   This examination was performed on a 1.5 Amber magnet.     COMPARISON: None     FINDINGS: There is a 1.3 x 1.0 x 1.5 cm sellar and suprasellar  mass consistent with a pituitary macroadenoma. This approaches  but does not definitely contact or impinge upon the optic chiasm  at this time. Sagittal midline view shows an otherwise normal  appearance of the midline structures. There is a normal  appearance of the  craniovertebral junction. Flow related signal  within the major intracranial vessels is preserved. There is no  hydrocephalus. There is mild generalized cerebral atrophy. There  is minimal T2 signal abnormality in the periventricular white  matter consistent with minimal chronic small vessel ischemic  changes. Diffusion weighted imaging shows no evidence of acute  infarct. There is enhancement of the macroadenoma that appears  relatively homogeneous. There is no other mass and no mass effect  or midline shift. There is no other pathologic contrast  enhancement.     IMPRESSION:  1. 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  2. Mild atrophy and minimal chronic small vessel ischemic  changes.     Electronically signed by:  Aram Young  6/7/2019 12:03 AM  CDT Workstation: [id number]   Imaging      MRI Pituitary With & Without Contrast (Order: [id number]) - 6/6/2019   Reprint Order Requisition      MRI Pituitary With & Without Contrast (Order #[id number]) on 6/6/19   Result Notes for MRI Pituitary With & Without Contrast      Notes recorded by Oseas Huizar APRN on 6/12/2019 at 9:32 AM CDT  Discussed results with patient;medication called into [facility]  ------    Notes recorded by Topher De Luna MD on 6/11/2019 at 12:21 PM CDT  Please tell him that from what we understand he was told to have a nonfunctional tumor but it is possible that it is making prolactin .   It is also possible that it is truly nonfunctional and the prolactin elevation is stalk effect but it is too high in my experience for this.     I think we should start cabergoline 0.5 mg tabs, half a tab on Monday and Thursday. Repeat labs with appt every 2 months.     Repeat MRI in 6 months. If it shrinks w prolactin normalization  it was a prolactinoma. If not , it is nonfunctional and we need to reconsider repeat surgery  ------                      Objective    Vital Signs:   There were no vitals taken for this visit.    Physical Exam  Neurological:      General: No focal deficit present.      Mental Status: He is alert.   Psychiatric:         Mood and Affect: Mood normal.         Thought Content: Thought content normal.         Judgment: Judgment normal.        Result Review :   The following data was reviewed by: DEEPA Olmos on 02/10/2021:  Common labs    Common Labsle 7/1/20 7/1/20 10/8/20 10/8/20 10/8/20 2/3/21 2/3/21 2/3/21 2/3/21    0803 [id number] 0957 0957 0957 1000 1000 1000 1000   Glucose 105 (A)  102 (A)   109 (A)      BUN 29 (A)  20   24 (A)      Creatinine 1.10  0.98   1.02      eGFR Non  Am 64  73   70      Sodium 139  143   138      Potassium 4.0  3.9   4.2      Chloride 98 (A)  103   100 (A)      Calcium 9.8  9.6   9.6      Albumin 4.30  4.30   4.30      Total Bilirubin 0.8  0.8   1.1      Alkaline Phosphatase 67  64   61      AST (SGOT) 33  35   42      ALT (SGPT) 28  36   40      WBC  5.71  4.89   5.24     Hemoglobin  15.4  15.0   16.0     Hematocrit  45.6  44.7   46.3     Platelets  165  162   168     Hemoglobin A1C     5.28   5.40    PSA         0.401   (A) Abnormal value                      Assessment and Plan    Diagnoses and all orders for this visit:    1. Central hypothyroidism (Primary)  -     CBC & Differential; Future  -     Comprehensive Metabolic Panel; Future  -     Vitamin D 25 Hydroxy; Future  -     Hemoglobin A1c; Future  -     Prolactin; Future  -     Insulin-like Growth Factor; Future  -     T4, Free; Future    2. History of pituitary surgery  -     CBC & Differential; Future  -     Comprehensive Metabolic Panel; Future  -     Vitamin D 25 Hydroxy; Future  -     Hemoglobin A1c; Future  -     Prolactin; Future  -     Insulin-like Growth Factor; Future  -     T4, Free; Future    3. Panhypopituitarism (CMS/HCC)  -     CBC & Differential; Future  -     Comprehensive Metabolic Panel; Future  -     Vitamin D 25 Hydroxy;  Future  -     Hemoglobin A1c; Future  -     Prolactin; Future  -     Insulin-like Growth Factor; Future  -     T4, Free; Future    4. Vitamin D deficiency  -     CBC & Differential; Future  -     Comprehensive Metabolic Panel; Future  -     Vitamin D 25 Hydroxy; Future  -     Hemoglobin A1c; Future  -     Prolactin; Future  -     Insulin-like Growth Factor; Future  -     T4, Free; Future    5. Impaired fasting glucose  -     CBC & Differential; Future  -     Comprehensive Metabolic Panel; Future  -     Vitamin D 25 Hydroxy; Future  -     Hemoglobin A1c; Future  -     Prolactin; Future  -     Insulin-like Growth Factor; Future  -     T4, Free; Future               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           Central hypothyroidism               Levothyroxine 112 mcg daily         Component      Latest Ref Rng & Units 2/3/2021   Free T4      0.93 - 1.70 ng/dL 1.30              Hypogonadotropic Hypogonadism     Testosterone cypionate         Off treatment            Component      Latest Ref Rng & Units 1/27/2020   Testosterone, Total      193.00 - 740.00 ng/dL 94.60 (L)         Component      Latest Ref Rng & Units 7/1/2020   Testosterone, Total      193.00 - 740.00 ng/dL 53.30 (L)       bone health      Vitamin d def.         Taken vitamin D weekly        -------------------------------------------------------------------     Was previously on hydrocortisone for adrenal insufficiency that westopped and demonstrated he has intact adrenal axis     --     Normal IGF1     Elevated prolactin     pituitary tumor        Component      Latest Ref Rng & Units 5/25/2018 1/7/2019 5/23/2019 7/23/2019   Prolactin      4.04 - 15.20 ng/mL 28.73 (H) 21.12 (H) 68.00 (H) 0.33 (L)         dostinex 0.5 mg 1/2 tablet once weekly                   Component      Latest Ref Rng & Units 2/3/2021   Prolactin      4.04 - 15.20 ng/mL 0.80 (L)             Had repeat MRI in Dec. 2019         He meet with neurosurgeon and states the tumor  was never completely removed just shaved                     MRI from Nov. 2019      1.36 pituitary macroadenoma previously measuring 1.5 cm in June 2019            Repeat Nov.2020 stable no change            IMpaired fasting glucose     Lab Results   Component Value Date    HGBA1C 5.40 02/03/2021                   Follow Up   Return in about 4 months (around 6/10/2021) for Recheck.  Patient was given instructions and counseling regarding his condition or for health maintenance advice. Please see specific information pulled into the AVS if appropriate.     We spent 25 minutes on the video call    I reviewed the patients electronic chart, reviewed medications, reviewed past and current labs, and active problems      I provided advice regarding thyroid disease management, refilled medications today, order future labs and made future appointment     Patient was advised to contact the office if there were any unanswered questions or any concerns

## 2021-06-10 ENCOUNTER — OFFICE VISIT (OUTPATIENT)
Dept: ENDOCRINOLOGY | Facility: CLINIC | Age: 81
End: 2021-06-10

## 2021-06-10 VITALS
DIASTOLIC BLOOD PRESSURE: 64 MMHG | SYSTOLIC BLOOD PRESSURE: 120 MMHG | OXYGEN SATURATION: 95 % | HEART RATE: 85 BPM | BODY MASS INDEX: 28 KG/M2 | HEIGHT: 70 IN | WEIGHT: 195.6 LBS

## 2021-06-10 DIAGNOSIS — E03.8 CENTRAL HYPOTHYROIDISM: Chronic | ICD-10-CM

## 2021-06-10 DIAGNOSIS — E22.1 HYPERPROLACTINEMIA (HCC): ICD-10-CM

## 2021-06-10 DIAGNOSIS — Z98.890 HISTORY OF PITUITARY SURGERY: Primary | ICD-10-CM

## 2021-06-10 DIAGNOSIS — E23.0 HYPOGONADOTROPIC HYPOGONADISM (HCC): Chronic | ICD-10-CM

## 2021-06-10 PROCEDURE — 99214 OFFICE O/P EST MOD 30 MIN: CPT | Performed by: NURSE PRACTITIONER

## 2021-06-10 NOTE — PROGRESS NOTES
Chief Complaint  Thyroid Problem    Subjective          Earnest CAMILLE Robin presents to [facility] ENDOCRINOLOGY  History of Present Illness     In office visit        Primary Care Provider     DEEPA Bob     80 year old male presents for follow up         Reason hypopituitarism     1.5 cm pituitary adenoma        Timing constant         Previous   Context, 2.8 cm pituitary adenoma     , Hypophysectomy July 26, 2018 June 2019        MRI --- 1.5 cm pituitary microadenoma      Nov. 2019      1.3 cm         Elevated prolactin            Alleviating - Dostinex           Quality , controlled on  hormone replacement     Severity, moderate     Symptoms -- none today      Central hypothyroidism     Currently on Levothyroxine 112 mcg daily        Hypogonadism     Stopped treatment due to side effects             No changes noted     Procedure: MRI head with and without contrast with pituitary  protocol on 6/6/2019     CLINICAL INDICATION: Elevated prolactin level, history of brain  tumor removed one year ago     TECHNIQUE: Multiplanar, multisequence MR images are obtained  throughout the head both prior to and following the  administration of IV gadolinium contrast. Thin section pre and  post contrast imaging through the sella is performed.   This examination was performed on a 1.5 Amber magnet.     COMPARISON: None     FINDINGS: There is a 1.3 x 1.0 x 1.5 cm sellar and suprasellar  mass consistent with a pituitary macroadenoma. This approaches  but does not definitely contact or impinge upon the optic chiasm  at this time. Sagittal midline view shows an otherwise normal  appearance of the midline structures. There is a normal  appearance of the craniovertebral junction. Flow related signal  within the major intracranial vessels is preserved. There is no  hydrocephalus. There is mild generalized cerebral atrophy. There  is minimal T2 signal abnormality in the periventricular white  matter  "consistent with minimal chronic small vessel ischemic  changes. Diffusion weighted imaging shows no evidence of acute  infarct. There is enhancement of the macroadenoma that appears  relatively homogeneous. There is no other mass and no mass effect  or midline shift. There is no other pathologic contrast  enhancement.     IMPRESSION:  1. 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  2. Mild atrophy and minimal chronic small vessel ischemic  changes.     Electronically signed by:  Aram Young  6/7/2019 12:03 AM  CDT Workstation: [id number]   Imaging      MRI Pituitary With & Without Contrast (Order: [id number]) - 6/6/2019   Reprint Order Requisition      MRI Pituitary With & Without Contrast (Order #[id number]) on 6/6/19   Result Notes for MRI Pituitary With & Without Contrast      Notes recorded by Oseas Huizar APRN on 6/12/2019 at 9:32 AM CDT  Discussed results with patient;medication called into [facility]  ------    Notes recorded by Topher De Luna MD on 6/11/2019 at 12:21 PM CDT  Please tell him that from what we understand he was told to have a nonfunctional tumor but it is possible that it is making prolactin .   It is also possible that it is truly nonfunctional and the prolactin elevation is stalk effect but it is too high in my experience for this.     I think we should start cabergoline 0.5 mg tabs, half a tab on Monday and Thursday. Repeat labs with appt every 2 months.     Repeat MRI in 6 months. If it shrinks w prolactin normalization  it was a prolactinoma. If not , it is nonfunctional and we need to reconsider repeat surgery  ------             Review of Systems - General ROS: negative          Objective   Vital Signs:   /64   Pulse 85   Ht 176.5 cm (69.5\")   Wt 88.7 kg (195 lb 9.6 oz)   SpO2 95%   BMI 28.47 kg/m²     Physical Exam  Cardiovascular:      Rate and Rhythm: Regular rhythm.      Heart " sounds: Normal heart sounds.   Pulmonary:      Breath sounds: Normal breath sounds.   Musculoskeletal:         General: Normal range of motion.      Cervical back: Normal range of motion.   Skin:     Coloration: Skin is not pale.   Neurological:      General: No focal deficit present.      Mental Status: He is alert.   Psychiatric:         Mood and Affect: Mood normal.         Thought Content: Thought content normal.         Judgment: Judgment normal.        Result Review :   The following data was reviewed by: DEEPA Olmos on 06/10/2021:  Common labs    Common Labsle 7/1/20 7/1/20 10/8/20 10/8/20 10/8/20 2/3/21 2/3/21 2/3/21 2/3/21    0803 0803 0957 0957 0957 1000 1000 1000 1000   Glucose 105 (A)  102 (A)   109 (A)      BUN 29 (A)  20   24 (A)      Creatinine 1.10  0.98   1.02      eGFR Non  Am 64  73   70      Sodium 139  143   138      Potassium 4.0  3.9   4.2      Chloride 98 (A)  103   100 (A)      Calcium 9.8  9.6   9.6      Albumin 4.30  4.30   4.30      Total Bilirubin 0.8  0.8   1.1      Alkaline Phosphatase 67  64   61      AST (SGOT) 33  35   42      ALT (SGPT) 28  36   40      WBC  5.71  4.89   5.24     Hemoglobin  15.4  15.0   16.0     Hematocrit  45.6  44.7   46.3     Platelets  165  162   168     Hemoglobin A1C     5.28   5.40    PSA         0.401   (A) Abnormal value                      Assessment and Plan    Diagnoses and all orders for this visit:    1. History of pituitary surgery (Primary)  -     MRI Pituitary With & Without Contrast; Future    2. Central hypothyroidism    3. Hypogonadotropic hypogonadism (CMS/HCC)    4. 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         Taken vitamin D weekly        -------------------------------------------------------------------     Was previously on hydrocortisone for adrenal insufficiency that westopped and demonstrated he has intact adrenal axis     --     Normal IGF1     Elevated prolactin     pituitary tumor        Component      Latest Ref Rng & Units 5/25/2018 1/7/2019 5/23/2019 7/23/2019   Prolactin      4.04 - 15.20 ng/mL 28.73 (H) 21.12 (H) 68.00 (H) 0.33 (L)         dostinex 0.5 mg 1/2 tablet once weekly                   Had repeat MRI in Dec. 2019         He meet with neurosurgeon and states the tumor was never completely removed just shaved                      MRI from Nov. 2019      1.36 pituitary macroadenoma previously measuring 1.5 cm in June 2019            Repeat Nov.2020 stable no change     Repeat Nov. 2021            IMpaired fasting glucose     Lab Results   Component Value Date     HGBA1C 5.40 02/03/2021                   Follow Up   Return in about 5 months (around 11/10/2021) for Recheck.  Patient was given instructions and counseling regarding his condition or for health maintenance advice. Please see specific information pulled into the AVS if appropriate.

## 2021-06-14 ENCOUNTER — LAB (OUTPATIENT)
Dept: LAB | Facility: OTHER | Age: 81
End: 2021-06-14

## 2021-06-14 DIAGNOSIS — E55.9 VITAMIN D DEFICIENCY: ICD-10-CM

## 2021-06-14 DIAGNOSIS — E03.8 CENTRAL HYPOTHYROIDISM: ICD-10-CM

## 2021-06-14 DIAGNOSIS — R73.01 IMPAIRED FASTING GLUCOSE: ICD-10-CM

## 2021-06-14 DIAGNOSIS — Z98.890 HISTORY OF PITUITARY SURGERY: ICD-10-CM

## 2021-06-14 DIAGNOSIS — E23.0 PANHYPOPITUITARISM (HCC): ICD-10-CM

## 2021-06-14 LAB
25(OH)D3 SERPL-MCNC: 42.9 NG/ML (ref 30–100)
ALBUMIN SERPL-MCNC: 4.4 G/DL (ref 3.5–5)
ALBUMIN/GLOB SERPL: 1.6 G/DL (ref 1.1–1.8)
ALP SERPL-CCNC: 69 U/L (ref 38–126)
ALT SERPL W P-5'-P-CCNC: 32 U/L
ANION GAP SERPL CALCULATED.3IONS-SCNC: 4 MMOL/L (ref 5–15)
AST SERPL-CCNC: 36 U/L (ref 17–59)
BILIRUB SERPL-MCNC: 0.7 MG/DL (ref 0.2–1.3)
BUN SERPL-MCNC: 23 MG/DL (ref 7–23)
BUN/CREAT SERPL: 23 (ref 7–25)
CALCIUM SPEC-SCNC: 9.8 MG/DL (ref 8.4–10.2)
CHLORIDE SERPL-SCNC: 104 MMOL/L (ref 101–112)
CO2 SERPL-SCNC: 32 MMOL/L (ref 22–30)
CREAT SERPL-MCNC: 1 MG/DL (ref 0.7–1.3)
DEPRECATED RDW RBC AUTO: 45.7 FL (ref 37–54)
EOSINOPHIL # BLD MANUAL: 0.18 10*3/MM3 (ref 0–0.4)
EOSINOPHIL NFR BLD MANUAL: 4 % (ref 0.3–6.2)
ERYTHROCYTE [DISTWIDTH] IN BLOOD BY AUTOMATED COUNT: 13 % (ref 12.3–15.4)
GFR SERPL CREATININE-BSD FRML MDRD: 72 ML/MIN/1.73 (ref 42–98)
GLOBULIN UR ELPH-MCNC: 2.8 GM/DL (ref 2.3–3.5)
GLUCOSE SERPL-MCNC: 119 MG/DL (ref 70–99)
HBA1C MFR BLD: 5.15 % (ref 4.8–5.6)
HCT VFR BLD AUTO: 46.2 % (ref 37.5–51)
HGB BLD-MCNC: 15.8 G/DL (ref 13–17.7)
LYMPHOCYTES # BLD MANUAL: 1.6 10*3/MM3 (ref 0.7–3.1)
LYMPHOCYTES NFR BLD MANUAL: 35 % (ref 19.6–45.3)
LYMPHOCYTES NFR BLD MANUAL: 9 % (ref 5–12)
MCH RBC QN AUTO: 33.6 PG (ref 26.6–33)
MCHC RBC AUTO-ENTMCNC: 34.2 G/DL (ref 31.5–35.7)
MCV RBC AUTO: 98.3 FL (ref 79–97)
MONOCYTES # BLD AUTO: 0.41 10*3/MM3 (ref 0.1–0.9)
NEUTROPHILS # BLD AUTO: 2.38 10*3/MM3 (ref 1.7–7)
NEUTROPHILS NFR BLD MANUAL: 52 % (ref 42.7–76)
PLATELET # BLD AUTO: 163 10*3/MM3 (ref 140–450)
PMV BLD AUTO: 9.6 FL (ref 6–12)
POTASSIUM SERPL-SCNC: 4 MMOL/L (ref 3.4–5)
PROLACTIN SERPL-MCNC: 0.94 NG/ML (ref 4.04–15.2)
PROT SERPL-MCNC: 7.2 G/DL (ref 6.3–8.6)
RBC # BLD AUTO: 4.7 10*6/MM3 (ref 4.14–5.8)
RBC MORPH BLD: NORMAL
SMALL PLATELETS BLD QL SMEAR: ADEQUATE
SODIUM SERPL-SCNC: 140 MMOL/L (ref 137–145)
T4 FREE SERPL-MCNC: 1.28 NG/DL (ref 0.93–1.7)
WBC # BLD AUTO: 4.58 10*3/MM3 (ref 3.4–10.8)
WBC MORPH BLD: NORMAL

## 2021-06-14 PROCEDURE — 85025 COMPLETE CBC W/AUTO DIFF WBC: CPT | Performed by: NURSE PRACTITIONER

## 2021-06-14 PROCEDURE — 83036 HEMOGLOBIN GLYCOSYLATED A1C: CPT | Performed by: NURSE PRACTITIONER

## 2021-06-14 PROCEDURE — 84439 ASSAY OF FREE THYROXINE: CPT | Performed by: NURSE PRACTITIONER

## 2021-06-14 PROCEDURE — 36415 COLL VENOUS BLD VENIPUNCTURE: CPT | Performed by: NURSE PRACTITIONER

## 2021-06-14 PROCEDURE — 80053 COMPREHEN METABOLIC PANEL: CPT | Performed by: NURSE PRACTITIONER

## 2021-06-14 PROCEDURE — 82306 VITAMIN D 25 HYDROXY: CPT | Performed by: NURSE PRACTITIONER

## 2021-06-14 PROCEDURE — 84146 ASSAY OF PROLACTIN: CPT | Performed by: NURSE PRACTITIONER

## 2021-06-14 PROCEDURE — 84305 ASSAY OF SOMATOMEDIN: CPT | Performed by: NURSE PRACTITIONER

## 2021-06-16 LAB — IGF-I SERPL-MCNC: 30 NG/ML (ref 40–194)

## 2021-06-17 RX ORDER — LEVOTHYROXINE SODIUM 112 UG/1
112 TABLET ORAL DAILY
Qty: 30 TABLET | Refills: 11 | Status: SHIPPED | OUTPATIENT
Start: 2021-06-17 | End: 2022-06-22

## 2021-11-09 ENCOUNTER — HOSPITAL ENCOUNTER (OUTPATIENT)
Dept: MRI IMAGING | Facility: HOSPITAL | Age: 81
Discharge: HOME | End: 2021-11-09

## 2021-11-09 ENCOUNTER — LAB (OUTPATIENT)
Dept: LAB | Facility: HOSPITAL | Age: 81
End: 2021-11-09

## 2021-11-09 ENCOUNTER — TRANSCRIBE ORDERS (OUTPATIENT)
Dept: MRI IMAGING | Facility: HOSPITAL | Age: 81
End: 2021-11-09

## 2021-11-09 DIAGNOSIS — D35.2 PITUITARY ADENOMA (HCC): Primary | ICD-10-CM

## 2021-11-09 DIAGNOSIS — Z98.890 HISTORY OF PITUITARY SURGERY: ICD-10-CM

## 2021-11-09 DIAGNOSIS — D35.2 PITUITARY ADENOMA (HCC): ICD-10-CM

## 2021-11-09 LAB
BUN SERPL-MCNC: 20 MG/DL (ref 8–23)
CREAT SERPL-MCNC: 1.02 MG/DL (ref 0.76–1.27)
GFR SERPL CREATININE-BSD FRML MDRD: 70 ML/MIN/1.73

## 2021-11-09 PROCEDURE — 70553 MRI BRAIN STEM W/O & W/DYE: CPT

## 2021-11-09 PROCEDURE — 82565 ASSAY OF CREATININE: CPT

## 2021-11-09 PROCEDURE — 25010000002 GADOTERIDOL PER 1 ML: Performed by: NURSE PRACTITIONER

## 2021-11-09 PROCEDURE — 36415 COLL VENOUS BLD VENIPUNCTURE: CPT

## 2021-11-09 PROCEDURE — 84520 ASSAY OF UREA NITROGEN: CPT

## 2021-11-09 PROCEDURE — A9579 GAD-BASE MR CONTRAST NOS,1ML: HCPCS | Performed by: NURSE PRACTITIONER

## 2021-11-09 RX ADMIN — GADOTERIDOL 20 ML: 279.3 INJECTION, SOLUTION INTRAVENOUS at 10:15

## 2021-11-10 ENCOUNTER — OFFICE VISIT (OUTPATIENT)
Dept: ENDOCRINOLOGY | Facility: CLINIC | Age: 81
End: 2021-11-10

## 2021-11-10 VITALS
WEIGHT: 199.3 LBS | DIASTOLIC BLOOD PRESSURE: 82 MMHG | OXYGEN SATURATION: 99 % | HEIGHT: 69 IN | SYSTOLIC BLOOD PRESSURE: 138 MMHG | BODY MASS INDEX: 29.52 KG/M2 | HEART RATE: 93 BPM

## 2021-11-10 DIAGNOSIS — Z98.890 HISTORY OF PITUITARY SURGERY: Primary | ICD-10-CM

## 2021-11-10 DIAGNOSIS — E03.8 CENTRAL HYPOTHYROIDISM: ICD-10-CM

## 2021-11-10 DIAGNOSIS — R73.01 IMPAIRED FASTING GLUCOSE: ICD-10-CM

## 2021-11-10 DIAGNOSIS — E23.0 PANHYPOPITUITARISM (HCC): ICD-10-CM

## 2021-11-10 PROCEDURE — 99214 OFFICE O/P EST MOD 30 MIN: CPT | Performed by: NURSE PRACTITIONER

## 2021-11-10 RX ORDER — CABERGOLINE 0.5 MG/1
TABLET ORAL
Qty: 8 TABLET | Refills: 11 | Status: SHIPPED | OUTPATIENT
Start: 2021-11-10

## 2021-11-10 NOTE — PROGRESS NOTES
Chief Complaint  Pituitary Problem    Subjective          Earnest Deny Robin presents to [facility] ENDOCRINOLOGY  History of Present Illness     In office visit       Primary Care Provider     DEEPA Bob        81 year old male presents for follow up     Reason hypopituitarism     1.5 cm pituitary adenoma         Timing constant         Previous   Context, 2.8 cm pituitary adenoma     , Hypophysectomy July 26, 2018 June 2019        MRI --- 1.5 cm pituitary microadenoma      Nov. 2019      1.3 cm         Elevated prolactin            Alleviating - Dostinex            Quality , controlled on  hormone replacement     Severity, moderate     Symptoms -- none today      Central hypothyroidism     Currently on Levothyroxine 112 mcg daily        Hypogonadism     Stopped treatment due to side effects             No changes noted        MRI brain/pituitary with and without IV contrast November 9, 2021     INDICATION: Pituitary adenoma follow-up     Pulse sequences: Multisequence sagittal, axial and coronal  imaging prior to and following intravenous administration of 20  mL of ProHance. Comparison with previous study dated June 6, 2019.     FINDINGS:  Possible slight decrease in size of enhancing pituitary lesion  consistent with macroadenoma compared with previous study. As  previously, this does not appear to encroach significantly on the  optic chiasm.  No midline shift, hemorrhage, hydrocephalus or extra-axial fluid  collections.  No abnormal vascular structures.  Atrophy with probable mild chronic small vessel white matter  ischemic change.  No widening of the CP angle cistern.  Visualized mastoids and sinuses grossly clear.     IMPRESSION:  Possible slight decreased size of pituitary macroadenoma compared  with previous study.  No new abnormality.  Atrophy.  Probable mild chronic small vessel white matter ischemic change.     Electronically signed by:  Esteban Watters MD   "11/10/2021 10:31 AM  [facility] Workstation: [id number]        Objective   Vital Signs:   /82   Pulse 93   Ht 175.3 cm (69\")   Wt 90.4 kg (199 lb 4.8 oz)   SpO2 99%   BMI 29.43 kg/m²     Physical Exam  Constitutional:       Appearance: Normal appearance.   Cardiovascular:      Rate and Rhythm: Regular rhythm.      Heart sounds: Normal heart sounds.   Musculoskeletal:      Cervical back: Normal range of motion.   Neurological:      Mental Status: He is alert.        Result Review :   The following data was reviewed by: DEEPA Olmos on 11/10/2021:  Common labs    Common Labsle 2/3/21 2/3/21 2/3/21 2/3/21 6/14/21 6/14/21 6/14/21 11/9/21 11/9/21    1000 1000 1000 1000 0854 0854 0854 0925 0925   Glucose 109 (A)    119 (A)       BUN 24 (A)    23    20   Creatinine 1.02    1.00   1.02    eGFR Non African Am 70    72   70    Sodium 138    140       Potassium 4.2    4.0       Chloride 100 (A)    104       Calcium 9.6    9.8       Albumin 4.30    4.40       Total Bilirubin 1.1    0.7       Alkaline Phosphatase 61    69       AST (SGOT) 42    36       ALT (SGPT) 40    32       WBC  5.24    4.58      Hemoglobin  16.0    15.8      Hematocrit  46.3    46.2      Platelets  168    163      Hemoglobin A1C   5.40    5.15     PSA    0.401        (A) Abnormal value                      Assessment and Plan    Diagnoses and all orders for this visit:    1. History of pituitary surgery (Primary)  -     Hemoglobin A1c  -     Prolactin  -     T4, Free  -     CBC & Differential  -     Comprehensive Metabolic Panel  -     Lipid Panel  -     Insulin-like Growth Factor    2. Central hypothyroidism  -     Hemoglobin A1c  -     Prolactin  -     T4, Free  -     CBC & Differential  -     Comprehensive Metabolic Panel  -     Lipid Panel  -     Insulin-like Growth Factor    3. Panhypopituitarism (HCC)  -     Hemoglobin A1c  -     Prolactin  -     T4, Free  -     CBC & Differential  -     Comprehensive Metabolic Panel  -     Lipid " Panel  -     Insulin-like Growth Factor    4. Impaired fasting glucose  -     Hemoglobin A1c  -     Prolactin  -     T4, Free  -     CBC & Differential  -     Comprehensive Metabolic Panel  -     Lipid Panel  -     Insulin-like Growth Factor    Other orders  -     cabergoline (DOSTINEX) 0.5 MG tablet; Take 1/2 tablet onThursday  Dispense: 8 tablet; Refill: 11           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           Central hypothyroidism         Component      Latest Ref Rng & Units 6/14/2021   Free T4      0.93 - 1.70 ng/dL 1.28           Levothyroxine 112 mcg daily -taking                    Hypogonadotropic Hypogonadism     Testosterone cypionate stopped         Off treatment            Component      Latest Ref Rng & Units 1/27/2020   Testosterone, Total      193.00 - 740.00 ng/dL 94.60 (L)         Component      Latest Ref Rng & Units 7/1/2020   Testosterone, Total      193.00 - 740.00 ng/dL 53.30 (L)       bone health      Vitamin d def.         Taken vitamin D weekly        -------------------------------------------------------------------     Was previously on hydrocortisone for adrenal insufficiency that westopped and demonstrated he has intact adrenal axis     --     Normal IGF1     Elevated prolactin     pituitary tumor        Component      Latest Ref Rng & Units 5/25/2018 1/7/2019 5/23/2019 7/23/2019   Prolactin      4.04 - 15.20 ng/mL 28.73 (H) 21.12 (H) 68.00 (H) 0.33 (L)         dostinex 0.5 mg 1/2 tablet once weekly                    Had repeat MRI in Dec. 2019         He meet with neurosurgeon and states the tumor was never completely removed just shaved                      MRI from Nov. 2019      1.36 pituitary macroadenoma previously measuring 1.5 cm in June 2019            t Nov.2020 stable no change       Nov. 2021 ----- stable, decrease in size            IMpaired fasting glucose     Lab Results   Component Value Date    HGBA1C 5.15 06/14/2021             Follow Up   Return in about 6  months (around 5/10/2022) for Recheck.  Patient was given instructions and counseling regarding his condition or for health maintenance advice. Please see specific information pulled into the AVS if appropriate.          This document has been electronically signed by DEEPA Olmos on November 10, 2021 14:19 CST.

## 2021-11-11 ENCOUNTER — LAB (OUTPATIENT)
Dept: LAB | Facility: OTHER | Age: 81
End: 2021-11-11

## 2021-11-11 LAB
ALBUMIN SERPL-MCNC: 4.4 G/DL (ref 3.5–5)
ALBUMIN/GLOB SERPL: 1.4 G/DL (ref 1.1–1.8)
ALP SERPL-CCNC: 60 U/L (ref 38–126)
ALT SERPL W P-5'-P-CCNC: 33 U/L
ANION GAP SERPL CALCULATED.3IONS-SCNC: 7 MMOL/L (ref 5–15)
AST SERPL-CCNC: 32 U/L (ref 17–59)
BILIRUB SERPL-MCNC: 0.7 MG/DL (ref 0.2–1.3)
BUN SERPL-MCNC: 26 MG/DL (ref 7–23)
BUN/CREAT SERPL: 26 (ref 7–25)
CALCIUM SPEC-SCNC: 9.7 MG/DL (ref 8.4–10.2)
CHLORIDE SERPL-SCNC: 103 MMOL/L (ref 101–112)
CHOLEST SERPL-MCNC: 160 MG/DL (ref 150–200)
CO2 SERPL-SCNC: 30 MMOL/L (ref 22–30)
CREAT SERPL-MCNC: 1 MG/DL (ref 0.7–1.3)
DEPRECATED RDW RBC AUTO: 46.5 FL (ref 37–54)
EOSINOPHIL # BLD MANUAL: 0.1 10*3/MM3 (ref 0–0.4)
EOSINOPHIL NFR BLD MANUAL: 2 % (ref 0.3–6.2)
ERYTHROCYTE [DISTWIDTH] IN BLOOD BY AUTOMATED COUNT: 13.4 % (ref 12.3–15.4)
GFR SERPL CREATININE-BSD FRML MDRD: 72 ML/MIN/1.73 (ref 42–98)
GLOBULIN UR ELPH-MCNC: 3.1 GM/DL (ref 2.3–3.5)
GLUCOSE SERPL-MCNC: 110 MG/DL (ref 70–99)
HCT VFR BLD AUTO: 45.3 % (ref 37.5–51)
HDLC SERPL-MCNC: 37 MG/DL (ref 40–59)
HGB BLD-MCNC: 15 G/DL (ref 13–17.7)
LDLC SERPL CALC-MCNC: 71 MG/DL
LDLC/HDLC SERPL: 1.57 {RATIO} (ref 0–3.55)
LYMPHOCYTES # BLD MANUAL: 1.75 10*3/MM3 (ref 0.7–3.1)
LYMPHOCYTES NFR BLD MANUAL: 12 % (ref 5–12)
LYMPHOCYTES NFR BLD MANUAL: 31 % (ref 19.6–45.3)
MCH RBC QN AUTO: 32.5 PG (ref 26.6–33)
MCHC RBC AUTO-ENTMCNC: 33.1 G/DL (ref 31.5–35.7)
MCV RBC AUTO: 98.3 FL (ref 79–97)
MONOCYTES # BLD AUTO: 0.62 10*3/MM3 (ref 0.1–0.9)
NEUTROPHILS # BLD AUTO: 2.67 10*3/MM3 (ref 1.7–7)
NEUTROPHILS NFR BLD MANUAL: 51 % (ref 42.7–76)
NEUTS BAND NFR BLD MANUAL: 1 % (ref 0–5)
PLATELET # BLD AUTO: 178 10*3/MM3 (ref 140–450)
PMV BLD AUTO: 9.5 FL (ref 6–12)
POTASSIUM SERPL-SCNC: 4.3 MMOL/L (ref 3.4–5)
PROT SERPL-MCNC: 7.5 G/DL (ref 6.3–8.6)
RBC # BLD AUTO: 4.61 10*6/MM3 (ref 4.14–5.8)
RBC MORPH BLD: NORMAL
SMALL PLATELETS BLD QL SMEAR: ADEQUATE
SODIUM SERPL-SCNC: 140 MMOL/L (ref 137–145)
TRIGL SERPL-MCNC: 324 MG/DL
VARIANT LYMPHS NFR BLD MANUAL: 3 % (ref 0–5)
VLDLC SERPL-MCNC: 52 MG/DL (ref 5–40)
WBC # BLD AUTO: 5.14 10*3/MM3 (ref 3.4–10.8)
WBC MORPH BLD: NORMAL

## 2021-11-11 PROCEDURE — 85025 COMPLETE CBC W/AUTO DIFF WBC: CPT | Performed by: NURSE PRACTITIONER

## 2021-11-11 PROCEDURE — 84305 ASSAY OF SOMATOMEDIN: CPT | Performed by: NURSE PRACTITIONER

## 2021-11-11 PROCEDURE — 80061 LIPID PANEL: CPT | Performed by: NURSE PRACTITIONER

## 2021-11-11 PROCEDURE — 36415 COLL VENOUS BLD VENIPUNCTURE: CPT | Performed by: NURSE PRACTITIONER

## 2021-11-11 PROCEDURE — 80053 COMPREHEN METABOLIC PANEL: CPT | Performed by: NURSE PRACTITIONER

## 2021-11-11 PROCEDURE — 83036 HEMOGLOBIN GLYCOSYLATED A1C: CPT | Performed by: NURSE PRACTITIONER

## 2021-11-11 PROCEDURE — 84439 ASSAY OF FREE THYROXINE: CPT | Performed by: NURSE PRACTITIONER

## 2021-11-11 PROCEDURE — 84146 ASSAY OF PROLACTIN: CPT | Performed by: NURSE PRACTITIONER

## 2021-11-12 LAB
HBA1C MFR BLD: 5.31 % (ref 4.8–5.6)
PROLACTIN SERPL-MCNC: 14.3 NG/ML (ref 4.04–15.2)
T4 FREE SERPL-MCNC: 1.17 NG/DL (ref 0.93–1.7)

## 2021-11-14 LAB — IGF-I SERPL-MCNC: 34 NG/ML (ref 40–194)

## 2022-06-22 RX ORDER — LEVOTHYROXINE SODIUM 112 UG/1
112 TABLET ORAL DAILY
Qty: 30 TABLET | Refills: 6 | Status: SHIPPED | OUTPATIENT
Start: 2022-06-22 | End: 2023-02-01

## 2022-11-30 RX ORDER — CABERGOLINE 0.5 MG/1
TABLET ORAL
Qty: 8 TABLET | Refills: 11 | OUTPATIENT
Start: 2022-11-30

## 2023-02-01 RX ORDER — LEVOTHYROXINE SODIUM 112 UG/1
112 TABLET ORAL DAILY
Qty: 30 TABLET | Refills: 6 | Status: SHIPPED | OUTPATIENT
Start: 2023-02-01